# Patient Record
Sex: MALE | Race: WHITE | NOT HISPANIC OR LATINO
[De-identification: names, ages, dates, MRNs, and addresses within clinical notes are randomized per-mention and may not be internally consistent; named-entity substitution may affect disease eponyms.]

---

## 2017-01-05 ENCOUNTER — APPOINTMENT (OUTPATIENT)
Dept: FAMILY MEDICINE | Facility: CLINIC | Age: 67
End: 2017-01-05

## 2017-01-05 VITALS
OXYGEN SATURATION: 99 % | HEART RATE: 80 BPM | BODY MASS INDEX: 28.41 KG/M2 | DIASTOLIC BLOOD PRESSURE: 72 MMHG | HEIGHT: 67 IN | SYSTOLIC BLOOD PRESSURE: 120 MMHG | TEMPERATURE: 98.2 F | RESPIRATION RATE: 12 BRPM | WEIGHT: 181 LBS

## 2017-01-05 DIAGNOSIS — Z23 ENCOUNTER FOR IMMUNIZATION: ICD-10-CM

## 2017-01-05 LAB
BILIRUB UR QL STRIP: NEGATIVE
CLARITY UR: CLEAR
COLLECTION METHOD: NORMAL
GLUCOSE UR-MCNC: NEGATIVE
HCG UR QL: 0.2 EU/DL
HGB UR QL STRIP.AUTO: NORMAL
KETONES UR-MCNC: NEGATIVE
LEUKOCYTE ESTERASE UR QL STRIP: NORMAL
NITRITE UR QL STRIP: NORMAL
PH UR STRIP: 5.5
PROT UR STRIP-MCNC: NORMAL
SP GR UR STRIP: 1.02

## 2017-01-07 LAB
24R-OH-CALCIDIOL SERPL-MCNC: 46.7 PG/ML
ALBUMIN SERPL ELPH-MCNC: 4.5 G/DL
ALP BLD-CCNC: 79 U/L
ALT SERPL-CCNC: 24 U/L
ANION GAP SERPL CALC-SCNC: 15 MMOL/L
AST SERPL-CCNC: 20 U/L
BASOPHILS # BLD AUTO: 0.03 K/UL
BASOPHILS NFR BLD AUTO: 0.3 %
BILIRUB SERPL-MCNC: 0.4 MG/DL
BUN SERPL-MCNC: 26 MG/DL
CALCIUM SERPL-MCNC: 9.7 MG/DL
CHLORIDE SERPL-SCNC: 105 MMOL/L
CHOLEST SERPL-MCNC: 177 MG/DL
CHOLEST/HDLC SERPL: 2.7 RATIO
CO2 SERPL-SCNC: 25 MMOL/L
CREAT SERPL-MCNC: 1.16 MG/DL
EOSINOPHIL # BLD AUTO: 0.1 K/UL
EOSINOPHIL NFR BLD AUTO: 1.1 %
ERYTHROCYTE [SEDIMENTATION RATE] IN BLOOD BY WESTERGREN METHOD: 2 MM/HR
FOLATE SERPL-MCNC: 18.7 NG/ML
GLUCOSE SERPL-MCNC: 105 MG/DL
HCT VFR BLD CALC: 47.9 %
HDLC SERPL-MCNC: 65 MG/DL
HGB BLD-MCNC: 15.6 G/DL
IMM GRANULOCYTES NFR BLD AUTO: 0.2 %
INR PPP: 0.97 RATIO
LDLC SERPL CALC-MCNC: 101 MG/DL
LYMPHOCYTES # BLD AUTO: 4.02 K/UL
LYMPHOCYTES NFR BLD AUTO: 44.3 %
MAN DIFF?: NORMAL
MCHC RBC-ENTMCNC: 30.8 PG
MCHC RBC-ENTMCNC: 32.6 GM/DL
MCV RBC AUTO: 94.5 FL
MONOCYTES # BLD AUTO: 0.52 K/UL
MONOCYTES NFR BLD AUTO: 5.7 %
NEUTROPHILS # BLD AUTO: 4.38 K/UL
NEUTROPHILS NFR BLD AUTO: 48.4 %
PLATELET # BLD AUTO: 278 K/UL
POTASSIUM SERPL-SCNC: 4.6 MMOL/L
PROT SERPL-MCNC: 6.9 G/DL
PT BLD: 11 SEC
RBC # BLD: 5.07 M/UL
RBC # FLD: 14 %
SODIUM SERPL-SCNC: 145 MMOL/L
T3 SERPL-MCNC: 122 NG/DL
T4 SERPL-MCNC: 7.9 UG/DL
TRIGL SERPL-MCNC: 57 MG/DL
TSH SERPL-ACNC: 1.89 UU/ML
VIT B12 SERPL-MCNC: 752 PG/ML
WBC # FLD AUTO: 9.07 K/UL

## 2017-01-11 ENCOUNTER — FORM ENCOUNTER (OUTPATIENT)
Age: 67
End: 2017-01-11

## 2017-01-11 VITALS
OXYGEN SATURATION: 98 % | RESPIRATION RATE: 16 BRPM | TEMPERATURE: 97 F | HEART RATE: 91 BPM | DIASTOLIC BLOOD PRESSURE: 80 MMHG | WEIGHT: 189.82 LBS | HEIGHT: 67 IN | SYSTOLIC BLOOD PRESSURE: 132 MMHG

## 2017-01-11 NOTE — ASU PATIENT PROFILE, ADULT - PMH
AN (acoustic neuroma)    BPH (benign prostatic hyperplasia)    Calculus of kidney  Kidney stone  Facial nerve paralysis    LETICIA (generalized anxiety disorder)    GERD (gastroesophageal reflux disease)    Measles    Meningitis    Nocturia

## 2017-01-11 NOTE — ASU PATIENT PROFILE, ADULT - PSH
History of cranioplasty    History of craniotomy    History of facial surgery    History of surgery  Rhytidectomy Superficial Muscloaponeurotic system

## 2017-01-12 ENCOUNTER — OUTPATIENT (OUTPATIENT)
Dept: OUTPATIENT SERVICES | Facility: HOSPITAL | Age: 67
LOS: 1 days | Discharge: HOME | End: 2017-01-12
Payer: MEDICARE

## 2017-01-12 ENCOUNTER — APPOINTMENT (OUTPATIENT)
Dept: OTOLARYNGOLOGY | Facility: HOSPITAL | Age: 67
End: 2017-01-12

## 2017-01-12 VITALS
TEMPERATURE: 98 F | SYSTOLIC BLOOD PRESSURE: 153 MMHG | HEART RATE: 81 BPM | RESPIRATION RATE: 16 BRPM | OXYGEN SATURATION: 98 % | DIASTOLIC BLOOD PRESSURE: 72 MMHG

## 2017-01-12 DIAGNOSIS — Z98.890 OTHER SPECIFIED POSTPROCEDURAL STATES: Chronic | ICD-10-CM

## 2017-01-12 PROCEDURE — 71046 X-RAY EXAM CHEST 2 VIEWS: CPT

## 2017-01-12 PROCEDURE — 13152 CMPLX RPR E/N/E/L 2.6-7.5 CM: CPT | Mod: XU

## 2017-01-12 PROCEDURE — 71020: CPT | Mod: 26

## 2017-01-12 PROCEDURE — 67900 REPAIR BROW DEFECT: CPT | Mod: RT

## 2017-01-12 PROCEDURE — 15839 EXC EXCESSIVE SKN OTHER AREA: CPT

## 2017-01-12 PROCEDURE — C1889: CPT

## 2017-01-12 PROCEDURE — 14060 TIS TRNFR E/N/E/L 10 SQ CM/<: CPT

## 2017-01-12 RX ORDER — SODIUM CHLORIDE 9 MG/ML
1000 INJECTION, SOLUTION INTRAVENOUS
Qty: 0 | Refills: 0 | Status: DISCONTINUED | OUTPATIENT
Start: 2017-01-12 | End: 2017-01-12

## 2017-01-12 RX ORDER — ONDANSETRON 8 MG/1
6 TABLET, FILM COATED ORAL EVERY 4 HOURS
Qty: 0 | Refills: 0 | Status: DISCONTINUED | OUTPATIENT
Start: 2017-01-12 | End: 2017-01-12

## 2017-01-12 RX ORDER — ACETAMINOPHEN 500 MG
650 TABLET ORAL EVERY 6 HOURS
Qty: 0 | Refills: 0 | Status: DISCONTINUED | OUTPATIENT
Start: 2017-01-12 | End: 2017-01-12

## 2017-01-12 RX ORDER — MORPHINE SULFATE 50 MG/1
4 CAPSULE, EXTENDED RELEASE ORAL
Qty: 0 | Refills: 0 | Status: DISCONTINUED | OUTPATIENT
Start: 2017-01-12 | End: 2017-01-12

## 2017-01-12 RX ADMIN — MORPHINE SULFATE 4 MILLIGRAM(S): 50 CAPSULE, EXTENDED RELEASE ORAL at 20:00

## 2017-01-12 RX ADMIN — ONDANSETRON 6 MILLIGRAM(S): 8 TABLET, FILM COATED ORAL at 20:52

## 2017-01-12 RX ADMIN — Medication 650 MILLIGRAM(S): at 21:49

## 2017-01-12 RX ADMIN — MORPHINE SULFATE 4 MILLIGRAM(S): 50 CAPSULE, EXTENDED RELEASE ORAL at 18:16

## 2017-01-12 RX ADMIN — MORPHINE SULFATE 4 MILLIGRAM(S): 50 CAPSULE, EXTENDED RELEASE ORAL at 18:30

## 2017-01-12 RX ADMIN — MORPHINE SULFATE 4 MILLIGRAM(S): 50 CAPSULE, EXTENDED RELEASE ORAL at 18:45

## 2017-01-12 RX ADMIN — Medication 650 MILLIGRAM(S): at 21:48

## 2017-01-20 DIAGNOSIS — H61.192: ICD-10-CM

## 2017-01-20 DIAGNOSIS — L98.7 EXCESSIVE AND REDUNDANT SKIN AND SUBCUTANEOUS TISSUE: ICD-10-CM

## 2017-01-20 DIAGNOSIS — M79.9 SOFT TISSUE DISORDER, UNSPECIFIED: ICD-10-CM

## 2017-01-20 DIAGNOSIS — M95.2 OTHER ACQUIRED DEFORMITY OF HEAD: ICD-10-CM

## 2017-01-20 DIAGNOSIS — N40.0 BENIGN PROSTATIC HYPERPLASIA WITHOUT LOWER URINARY TRACT SYMPTOMS: ICD-10-CM

## 2017-01-20 DIAGNOSIS — G51.0 BELL'S PALSY: ICD-10-CM

## 2017-01-20 DIAGNOSIS — K21.9 GASTRO-ESOPHAGEAL REFLUX DISEASE WITHOUT ESOPHAGITIS: ICD-10-CM

## 2017-01-20 DIAGNOSIS — L91.0 HYPERTROPHIC SCAR: ICD-10-CM

## 2017-01-20 DIAGNOSIS — H02.401 UNSPECIFIED PTOSIS OF RIGHT EYELID: ICD-10-CM

## 2017-02-27 PROBLEM — G51.0 BELL'S PALSY: Chronic | Status: ACTIVE | Noted: 2017-01-11

## 2017-02-27 PROBLEM — N40.0 BENIGN PROSTATIC HYPERPLASIA WITHOUT LOWER URINARY TRACT SYMPTOMS: Chronic | Status: ACTIVE | Noted: 2017-01-11

## 2017-02-27 PROBLEM — F41.1 GENERALIZED ANXIETY DISORDER: Chronic | Status: ACTIVE | Noted: 2017-01-11

## 2017-02-27 PROBLEM — R35.1 NOCTURIA: Chronic | Status: ACTIVE | Noted: 2017-01-11

## 2017-02-27 PROBLEM — D33.3 BENIGN NEOPLASM OF CRANIAL NERVES: Chronic | Status: ACTIVE | Noted: 2017-01-11

## 2017-02-27 PROBLEM — B05.9 MEASLES WITHOUT COMPLICATION: Chronic | Status: ACTIVE | Noted: 2017-01-11

## 2017-02-27 PROBLEM — G03.9 MENINGITIS, UNSPECIFIED: Chronic | Status: ACTIVE | Noted: 2017-01-11

## 2017-02-27 PROBLEM — K21.9 GASTRO-ESOPHAGEAL REFLUX DISEASE WITHOUT ESOPHAGITIS: Chronic | Status: ACTIVE | Noted: 2017-01-11

## 2017-03-01 ENCOUNTER — APPOINTMENT (OUTPATIENT)
Dept: FAMILY MEDICINE | Facility: CLINIC | Age: 67
End: 2017-03-01

## 2017-03-01 VITALS
BODY MASS INDEX: 28.41 KG/M2 | WEIGHT: 181 LBS | DIASTOLIC BLOOD PRESSURE: 74 MMHG | HEIGHT: 67 IN | HEART RATE: 74 BPM | RESPIRATION RATE: 13 BRPM | OXYGEN SATURATION: 94 % | SYSTOLIC BLOOD PRESSURE: 122 MMHG

## 2017-03-01 LAB
BILIRUB UR QL STRIP: NORMAL
CLARITY UR: CLEAR
COLLECTION METHOD: NORMAL
GLUCOSE UR-MCNC: NORMAL
HBA1C MFR BLD HPLC: 5.4
HCG UR QL: 0.2 EU/DL
HGB UR QL STRIP.AUTO: NORMAL
KETONES UR-MCNC: NORMAL
LEUKOCYTE ESTERASE UR QL STRIP: NORMAL
NITRITE UR QL STRIP: NORMAL
PH UR STRIP: 5.5
PROT UR STRIP-MCNC: 30
SP GR UR STRIP: 1.03

## 2017-03-12 ENCOUNTER — FORM ENCOUNTER (OUTPATIENT)
Age: 67
End: 2017-03-12

## 2017-03-13 ENCOUNTER — APPOINTMENT (OUTPATIENT)
Dept: OTOLARYNGOLOGY | Facility: CLINIC | Age: 67
End: 2017-03-13

## 2017-03-13 ENCOUNTER — OUTPATIENT (OUTPATIENT)
Dept: OUTPATIENT SERVICES | Facility: HOSPITAL | Age: 67
LOS: 1 days | End: 2017-03-13
Payer: MEDICARE

## 2017-03-13 ENCOUNTER — APPOINTMENT (OUTPATIENT)
Dept: PULMONOLOGY | Facility: CLINIC | Age: 67
End: 2017-03-13

## 2017-03-13 VITALS
HEIGHT: 67 IN | DIASTOLIC BLOOD PRESSURE: 91 MMHG | WEIGHT: 181 LBS | BODY MASS INDEX: 28.41 KG/M2 | SYSTOLIC BLOOD PRESSURE: 134 MMHG | TEMPERATURE: 97.8 F | HEART RATE: 90 BPM

## 2017-03-13 DIAGNOSIS — Z98.890 OTHER SPECIFIED POSTPROCEDURAL STATES: Chronic | ICD-10-CM

## 2017-03-13 PROCEDURE — 70486 CT MAXILLOFACIAL W/O DYE: CPT | Mod: 26

## 2017-03-13 PROCEDURE — 71020: CPT | Mod: 26

## 2017-03-13 PROCEDURE — 70486 CT MAXILLOFACIAL W/O DYE: CPT

## 2017-03-13 PROCEDURE — 71046 X-RAY EXAM CHEST 2 VIEWS: CPT

## 2017-03-15 ENCOUNTER — LABORATORY RESULT (OUTPATIENT)
Age: 67
End: 2017-03-15

## 2017-03-15 ENCOUNTER — APPOINTMENT (OUTPATIENT)
Dept: FAMILY MEDICINE | Facility: CLINIC | Age: 67
End: 2017-03-15

## 2017-03-15 ENCOUNTER — RESULT REVIEW (OUTPATIENT)
Age: 67
End: 2017-03-15

## 2017-03-15 VITALS
HEIGHT: 67 IN | TEMPERATURE: 98.7 F | WEIGHT: 181 LBS | HEART RATE: 90 BPM | SYSTOLIC BLOOD PRESSURE: 128 MMHG | RESPIRATION RATE: 12 BRPM | BODY MASS INDEX: 28.41 KG/M2 | OXYGEN SATURATION: 95 % | DIASTOLIC BLOOD PRESSURE: 74 MMHG

## 2017-03-15 VITALS
HEIGHT: 67 IN | TEMPERATURE: 97 F | HEART RATE: 66 BPM | SYSTOLIC BLOOD PRESSURE: 126 MMHG | DIASTOLIC BLOOD PRESSURE: 74 MMHG | WEIGHT: 188.94 LBS | OXYGEN SATURATION: 97 % | RESPIRATION RATE: 16 BRPM

## 2017-03-15 LAB — GLUCOSE BLDC GLUCOMTR-MCNC: 85

## 2017-03-16 ENCOUNTER — OUTPATIENT (OUTPATIENT)
Dept: OUTPATIENT SERVICES | Facility: HOSPITAL | Age: 67
LOS: 1 days | Discharge: HOME | End: 2017-03-16
Payer: MEDICARE

## 2017-03-16 ENCOUNTER — APPOINTMENT (OUTPATIENT)
Dept: OTOLARYNGOLOGY | Facility: HOSPITAL | Age: 67
End: 2017-03-16

## 2017-03-16 VITALS
OXYGEN SATURATION: 97 % | RESPIRATION RATE: 16 BRPM | SYSTOLIC BLOOD PRESSURE: 138 MMHG | HEART RATE: 74 BPM | DIASTOLIC BLOOD PRESSURE: 74 MMHG

## 2017-03-16 DIAGNOSIS — Z98.890 OTHER SPECIFIED POSTPROCEDURAL STATES: Chronic | ICD-10-CM

## 2017-03-16 PROCEDURE — 15839 EXC EXCESSIVE SKN OTHER AREA: CPT | Mod: 76,78

## 2017-03-16 PROCEDURE — 88300 SURGICAL PATH GROSS: CPT

## 2017-03-16 PROCEDURE — 21012 EXC FACE LES SBQ 2 CM/>: CPT

## 2017-03-16 PROCEDURE — C1889: CPT

## 2017-03-16 PROCEDURE — 67560 REVISE EYE SOCKET IMPLANT: CPT | Mod: RT

## 2017-03-16 PROCEDURE — 21282 LATERAL CANTHOPEXY: CPT | Mod: 78

## 2017-03-16 PROCEDURE — 21282 LATERAL CANTHOPEXY: CPT

## 2017-03-16 PROCEDURE — 67413 EXPLORE/TREAT EYE SOCKET: CPT | Mod: 78,RT

## 2017-03-16 RX ORDER — CEFUROXIME AXETIL 250 MG
1 TABLET ORAL
Qty: 10 | Refills: 0
Start: 2017-03-16 | End: 2017-03-21

## 2017-03-16 RX ORDER — ONDANSETRON 8 MG/1
4 TABLET, FILM COATED ORAL ONCE
Qty: 0 | Refills: 0 | Status: DISCONTINUED | OUTPATIENT
Start: 2017-03-16 | End: 2017-03-16

## 2017-03-16 RX ORDER — MORPHINE SULFATE 50 MG/1
4 CAPSULE, EXTENDED RELEASE ORAL
Qty: 0 | Refills: 0 | Status: DISCONTINUED | OUTPATIENT
Start: 2017-03-16 | End: 2017-03-16

## 2017-03-16 RX ORDER — SODIUM CHLORIDE 9 MG/ML
1000 INJECTION, SOLUTION INTRAVENOUS
Qty: 0 | Refills: 0 | Status: DISCONTINUED | OUTPATIENT
Start: 2017-03-16 | End: 2017-03-16

## 2017-03-16 RX ORDER — ACETAMINOPHEN WITH CODEINE 300MG-30MG
1 TABLET ORAL
Qty: 16 | Refills: 0
Start: 2017-03-16 | End: 2017-03-20

## 2017-03-16 RX ADMIN — MORPHINE SULFATE 4 MILLIGRAM(S): 50 CAPSULE, EXTENDED RELEASE ORAL at 10:43

## 2017-03-20 LAB — SURGICAL PATHOLOGY STUDY: SIGNIFICANT CHANGE UP
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
## 2017-03-22 DIAGNOSIS — L03.211 CELLULITIS OF FACE: ICD-10-CM

## 2017-03-22 DIAGNOSIS — T85.848A PAIN DUE TO OTHER INTERNAL PROSTHETIC DEVICES, IMPLANTS AND GRAFTS, INITIAL ENCOUNTER: ICD-10-CM

## 2017-03-22 DIAGNOSIS — T85.328A: ICD-10-CM
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
## 2017-03-22 DIAGNOSIS — G51.0 BELL'S PALSY: ICD-10-CM

## 2017-03-27 ENCOUNTER — APPOINTMENT (OUTPATIENT)
Dept: OTOLARYNGOLOGY | Facility: CLINIC | Age: 67
End: 2017-03-27

## 2017-03-27 VITALS
WEIGHT: 181 LBS | HEART RATE: 69 BPM | HEIGHT: 67 IN | DIASTOLIC BLOOD PRESSURE: 84 MMHG | TEMPERATURE: 98.2 F | SYSTOLIC BLOOD PRESSURE: 131 MMHG | BODY MASS INDEX: 28.41 KG/M2

## 2017-04-03 ENCOUNTER — APPOINTMENT (OUTPATIENT)
Dept: OTOLARYNGOLOGY | Facility: CLINIC | Age: 67
End: 2017-04-03

## 2017-04-03 VITALS
BODY MASS INDEX: 28.41 KG/M2 | HEART RATE: 62 BPM | TEMPERATURE: 98 F | DIASTOLIC BLOOD PRESSURE: 76 MMHG | SYSTOLIC BLOOD PRESSURE: 104 MMHG | WEIGHT: 181 LBS | HEIGHT: 67 IN

## 2017-04-05 VITALS
HEIGHT: 67 IN | RESPIRATION RATE: 16 BRPM | SYSTOLIC BLOOD PRESSURE: 136 MMHG | DIASTOLIC BLOOD PRESSURE: 72 MMHG | OXYGEN SATURATION: 98 % | HEART RATE: 61 BPM | WEIGHT: 189.6 LBS | TEMPERATURE: 97 F

## 2017-04-06 ENCOUNTER — APPOINTMENT (OUTPATIENT)
Dept: OTOLARYNGOLOGY | Facility: HOSPITAL | Age: 67
End: 2017-04-06

## 2017-04-06 ENCOUNTER — OUTPATIENT (OUTPATIENT)
Dept: OUTPATIENT SERVICES | Facility: HOSPITAL | Age: 67
LOS: 1 days | Discharge: HOME | End: 2017-04-06
Payer: MEDICARE

## 2017-04-06 VITALS
SYSTOLIC BLOOD PRESSURE: 141 MMHG | TEMPERATURE: 97 F | RESPIRATION RATE: 17 BRPM | OXYGEN SATURATION: 98 % | DIASTOLIC BLOOD PRESSURE: 90 MMHG

## 2017-04-06 DIAGNOSIS — Z98.890 OTHER SPECIFIED POSTPROCEDURAL STATES: Chronic | ICD-10-CM

## 2017-04-06 PROCEDURE — C1889: CPT

## 2017-04-06 PROCEDURE — 67912 CORRECTION EYELID W/IMPLANT: CPT | Mod: E3

## 2017-04-06 PROCEDURE — 67917 REPAIR EYELID DEFECT: CPT | Mod: 58,E2

## 2017-04-06 PROCEDURE — 67912 CORRECTION EYELID W/IMPLANT: CPT | Mod: 58,E1

## 2017-04-06 PROCEDURE — 67950 REVISION OF EYELID: CPT | Mod: E4

## 2017-04-06 RX ORDER — NEOMYCIN/POLYMYXIN B/DEXAMETHA 0.1 %
2 SUSPENSION, DROPS(FINAL DOSAGE FORM)(ML) OPHTHALMIC (EYE)
Qty: 1 | Refills: 1 | OUTPATIENT
Start: 2017-04-06

## 2017-04-06 RX ORDER — NEOMYCIN/POLYMYXIN B/DEXAMETHA 0.1 %
2 SUSPENSION, DROPS(FINAL DOSAGE FORM)(ML) OPHTHALMIC (EYE)
Qty: 1 | Refills: 1
Start: 2017-04-06

## 2017-04-06 RX ORDER — ACETAMINOPHEN WITH CODEINE 300MG-30MG
1 TABLET ORAL
Qty: 20 | Refills: 0 | OUTPATIENT
Start: 2017-04-06

## 2017-04-06 RX ORDER — SODIUM CHLORIDE 9 MG/ML
1000 INJECTION, SOLUTION INTRAVENOUS
Qty: 0 | Refills: 0 | Status: DISCONTINUED | OUTPATIENT
Start: 2017-04-06 | End: 2017-04-06

## 2017-04-06 RX ORDER — MORPHINE SULFATE 50 MG/1
4 CAPSULE, EXTENDED RELEASE ORAL
Qty: 0 | Refills: 0 | Status: DISCONTINUED | OUTPATIENT
Start: 2017-04-06 | End: 2017-04-06

## 2017-04-06 RX ORDER — ACETAMINOPHEN WITH CODEINE 300MG-30MG
1 TABLET ORAL
Qty: 8 | Refills: 0
Start: 2017-04-06

## 2017-04-06 RX ORDER — ONDANSETRON 8 MG/1
4 TABLET, FILM COATED ORAL EVERY 6 HOURS
Qty: 0 | Refills: 0 | Status: DISCONTINUED | OUTPATIENT
Start: 2017-04-06 | End: 2017-04-06

## 2017-04-06 RX ORDER — BACITRACIN 500 [USP'U]/G
1 OINTMENT OPHTHALMIC
Qty: 1 | Refills: 0 | OUTPATIENT
Start: 2017-04-06

## 2017-04-06 RX ORDER — BACITRACIN 500 [USP'U]/G
1 OINTMENT OPHTHALMIC
Qty: 1 | Refills: 0
Start: 2017-04-06

## 2017-04-06 NOTE — PACU DISCHARGE NOTE - COMMENTS
Written and verbal discharge instructions given to Pt.  Pt verbalized understanding of post operative instructions. Copy of provider instructions given to Pt.

## 2017-04-06 NOTE — BRIEF OPERATIVE NOTE - PROCEDURE
Gold weight inserted in eyelid  04/06/2017    Active  AVERMA  Canthoplasty of right eye  04/06/2017    Active  AVERMA  Scleral patch graft of right eye  04/06/2017    Active  AVERMA

## 2017-04-10 ENCOUNTER — APPOINTMENT (OUTPATIENT)
Dept: OTOLARYNGOLOGY | Facility: CLINIC | Age: 67
End: 2017-04-10

## 2017-04-11 DIAGNOSIS — H02.231 PARALYTIC LAGOPHTHALMOS RIGHT UPPER EYELID: ICD-10-CM

## 2017-04-11 DIAGNOSIS — K21.9 GASTRO-ESOPHAGEAL REFLUX DISEASE WITHOUT ESOPHAGITIS: ICD-10-CM

## 2017-04-11 DIAGNOSIS — G51.0 BELL'S PALSY: ICD-10-CM

## 2017-04-11 DIAGNOSIS — L57.4 CUTIS LAXA SENILIS: ICD-10-CM

## 2017-04-11 DIAGNOSIS — N40.0 BENIGN PROSTATIC HYPERPLASIA WITHOUT LOWER URINARY TRACT SYMPTOMS: ICD-10-CM

## 2017-04-11 DIAGNOSIS — F41.1 GENERALIZED ANXIETY DISORDER: ICD-10-CM

## 2017-04-11 DIAGNOSIS — R73.03 PREDIABETES: ICD-10-CM

## 2017-04-17 ENCOUNTER — APPOINTMENT (OUTPATIENT)
Dept: OTOLARYNGOLOGY | Facility: CLINIC | Age: 67
End: 2017-04-17

## 2017-05-04 ENCOUNTER — APPOINTMENT (OUTPATIENT)
Dept: OTOLARYNGOLOGY | Facility: CLINIC | Age: 67
End: 2017-05-04

## 2017-05-04 ENCOUNTER — APPOINTMENT (OUTPATIENT)
Dept: FAMILY MEDICINE | Facility: CLINIC | Age: 67
End: 2017-05-04

## 2017-05-04 VITALS
HEART RATE: 100 BPM | WEIGHT: 185 LBS | SYSTOLIC BLOOD PRESSURE: 120 MMHG | DIASTOLIC BLOOD PRESSURE: 74 MMHG | RESPIRATION RATE: 12 BRPM | BODY MASS INDEX: 29.03 KG/M2 | OXYGEN SATURATION: 98 % | HEIGHT: 67 IN

## 2017-05-04 RX ORDER — CEFADROXIL 500 MG/1
500 CAPSULE ORAL
Qty: 28 | Refills: 0 | Status: DISCONTINUED | COMMUNITY
Start: 2017-04-06 | End: 2017-05-04

## 2017-05-04 RX ORDER — BACITRACIN 500 [USP'U]/G
500 OINTMENT OPHTHALMIC
Qty: 35 | Refills: 0 | Status: DISCONTINUED | COMMUNITY
Start: 2017-04-06 | End: 2017-05-04

## 2017-08-25 ENCOUNTER — APPOINTMENT (OUTPATIENT)
Dept: OPHTHALMOLOGY | Facility: CLINIC | Age: 67
End: 2017-08-25

## 2017-10-05 ENCOUNTER — APPOINTMENT (OUTPATIENT)
Dept: OPHTHALMOLOGY | Facility: CLINIC | Age: 67
End: 2017-10-05

## 2017-10-17 ENCOUNTER — LABORATORY RESULT (OUTPATIENT)
Age: 67
End: 2017-10-17

## 2017-10-17 ENCOUNTER — APPOINTMENT (OUTPATIENT)
Dept: FAMILY MEDICINE | Facility: CLINIC | Age: 67
End: 2017-10-17
Payer: MEDICARE

## 2017-10-17 VITALS
OXYGEN SATURATION: 98 % | DIASTOLIC BLOOD PRESSURE: 74 MMHG | HEART RATE: 89 BPM | SYSTOLIC BLOOD PRESSURE: 122 MMHG | BODY MASS INDEX: 29.03 KG/M2 | WEIGHT: 185 LBS | HEIGHT: 67 IN | RESPIRATION RATE: 12 BRPM

## 2017-10-17 DIAGNOSIS — Z86.69 PERSONAL HISTORY OF OTHER DISEASES OF THE NERVOUS SYSTEM AND SENSE ORGANS: ICD-10-CM

## 2017-10-17 DIAGNOSIS — L08.9 LOCAL INFECTION OF THE SKIN AND SUBCUTANEOUS TISSUE, UNSPECIFIED: ICD-10-CM

## 2017-10-17 PROCEDURE — 36415 COLL VENOUS BLD VENIPUNCTURE: CPT

## 2017-10-17 PROCEDURE — G0008: CPT | Mod: 59

## 2017-10-17 PROCEDURE — 99214 OFFICE O/P EST MOD 30 MIN: CPT | Mod: 25

## 2017-10-17 PROCEDURE — 90662 IIV NO PRSV INCREASED AG IM: CPT

## 2017-10-17 RX ORDER — NEOMYCIN SULFATE, POLYMYXIN B SULFATE AND DEXAMETHASONE 3.5; 10000; 1 MG/ML; [USP'U]/ML; MG/ML
3.5-10000-0.1 SUSPENSION OPHTHALMIC
Qty: 5 | Refills: 0 | Status: DISCONTINUED | COMMUNITY
Start: 2017-04-06 | End: 2017-10-17

## 2017-10-17 RX ORDER — ESZOPICLONE 3 MG/1
3 TABLET, FILM COATED ORAL
Qty: 30 | Refills: 0 | Status: DISCONTINUED | COMMUNITY
Start: 2017-01-05 | End: 2017-10-17

## 2017-10-17 RX ORDER — ACETAMINOPHEN AND CODEINE 300; 30 MG/1; MG/1
300-30 TABLET ORAL
Qty: 8 | Refills: 0 | Status: DISCONTINUED | COMMUNITY
Start: 2017-04-06 | End: 2017-10-17

## 2017-10-17 RX ORDER — TAMSULOSIN HYDROCHLORIDE 0.4 MG/1
0.4 CAPSULE ORAL
Qty: 90 | Refills: 3 | Status: DISCONTINUED | COMMUNITY
Start: 2017-05-04 | End: 2017-10-17

## 2017-10-18 LAB
ALBUMIN SERPL ELPH-MCNC: 4.5 G/DL
ALP BLD-CCNC: 84 U/L
ALT SERPL-CCNC: 24 U/L
ANION GAP SERPL CALC-SCNC: 14 MMOL/L
APPEARANCE: CLEAR
AST SERPL-CCNC: 16 U/L
BASOPHILS # BLD AUTO: 0.03 K/UL
BASOPHILS NFR BLD AUTO: 0.4 %
BILIRUB SERPL-MCNC: 0.4 MG/DL
BILIRUBIN URINE: NEGATIVE
BLOOD URINE: ABNORMAL
BUN SERPL-MCNC: 25 MG/DL
CALCIUM SERPL-MCNC: 9.9 MG/DL
CHLORIDE SERPL-SCNC: 105 MMOL/L
CHOLEST SERPL-MCNC: 168 MG/DL
CHOLEST/HDLC SERPL: 2.9 RATIO
CO2 SERPL-SCNC: 26 MMOL/L
COLOR: YELLOW
CREAT SERPL-MCNC: 1.01 MG/DL
EOSINOPHIL # BLD AUTO: 0.11 K/UL
EOSINOPHIL NFR BLD AUTO: 1.3 %
GLUCOSE QUALITATIVE U: NEGATIVE MG/DL
GLUCOSE SERPL-MCNC: 89 MG/DL
HCT VFR BLD CALC: 48.3 %
HDLC SERPL-MCNC: 57 MG/DL
HGB BLD-MCNC: 15.9 G/DL
IMM GRANULOCYTES NFR BLD AUTO: 0.1 %
KETONES URINE: NEGATIVE
LDLC SERPL CALC-MCNC: 80 MG/DL
LEUKOCYTE ESTERASE URINE: NEGATIVE
LYMPHOCYTES # BLD AUTO: 2.94 K/UL
LYMPHOCYTES NFR BLD AUTO: 35.3 %
MAN DIFF?: NORMAL
MCHC RBC-ENTMCNC: 30.8 PG
MCHC RBC-ENTMCNC: 32.9 GM/DL
MCV RBC AUTO: 93.6 FL
MONOCYTES # BLD AUTO: 0.53 K/UL
MONOCYTES NFR BLD AUTO: 6.4 %
NEUTROPHILS # BLD AUTO: 4.7 K/UL
NEUTROPHILS NFR BLD AUTO: 56.5 %
NITRITE URINE: NEGATIVE
PH URINE: 5
PLATELET # BLD AUTO: 297 K/UL
POTASSIUM SERPL-SCNC: 4.3 MMOL/L
PROT SERPL-MCNC: 7 G/DL
PROTEIN URINE: ABNORMAL MG/DL
RBC # BLD: 5.16 M/UL
RBC # FLD: 13.9 %
SODIUM SERPL-SCNC: 145 MMOL/L
SPECIFIC GRAVITY URINE: 1.03
TRIGL SERPL-MCNC: 155 MG/DL
TSH SERPL-ACNC: 1.76 UIU/ML
UROBILINOGEN URINE: NEGATIVE MG/DL
WBC # FLD AUTO: 8.32 K/UL

## 2017-11-01 ENCOUNTER — APPOINTMENT (OUTPATIENT)
Dept: FAMILY MEDICINE | Facility: CLINIC | Age: 67
End: 2017-11-01
Payer: MEDICARE

## 2017-11-01 VITALS
WEIGHT: 191 LBS | SYSTOLIC BLOOD PRESSURE: 134 MMHG | DIASTOLIC BLOOD PRESSURE: 84 MMHG | HEIGHT: 67 IN | OXYGEN SATURATION: 98 % | RESPIRATION RATE: 12 BRPM | TEMPERATURE: 98.1 F | HEART RATE: 67 BPM | BODY MASS INDEX: 29.98 KG/M2

## 2017-11-01 PROCEDURE — 99215 OFFICE O/P EST HI 40 MIN: CPT | Mod: 25

## 2017-11-01 PROCEDURE — 36415 COLL VENOUS BLD VENIPUNCTURE: CPT

## 2017-11-01 RX ORDER — ERYTHROMYCIN 5 MG/G
5 OINTMENT OPHTHALMIC
Qty: 4 | Refills: 0 | Status: COMPLETED | COMMUNITY
Start: 2017-09-12

## 2017-11-02 LAB
ALBUMIN SERPL ELPH-MCNC: 4.1 G/DL
ALP BLD-CCNC: 74 U/L
ALT SERPL-CCNC: 29 U/L
ANION GAP SERPL CALC-SCNC: 17 MMOL/L
AST SERPL-CCNC: 23 U/L
BASOPHILS # BLD AUTO: 0.03 K/UL
BASOPHILS NFR BLD AUTO: 0.3 %
BILIRUB SERPL-MCNC: 0.3 MG/DL
BUN SERPL-MCNC: 18 MG/DL
CALCIUM SERPL-MCNC: 9.7 MG/DL
CHLORIDE SERPL-SCNC: 105 MMOL/L
CO2 SERPL-SCNC: 23 MMOL/L
CREAT SERPL-MCNC: 1.04 MG/DL
EOSINOPHIL # BLD AUTO: 0.07 K/UL
EOSINOPHIL NFR BLD AUTO: 0.8 %
ERYTHROCYTE [SEDIMENTATION RATE] IN BLOOD BY WESTERGREN METHOD: 5 MM/HR
GLUCOSE SERPL-MCNC: 71 MG/DL
HBA1C MFR BLD HPLC: 5.7 %
HCT VFR BLD CALC: 48.3 %
HGB BLD-MCNC: 15.8 G/DL
IMM GRANULOCYTES NFR BLD AUTO: 0.2 %
LYMPHOCYTES # BLD AUTO: 2.93 K/UL
LYMPHOCYTES NFR BLD AUTO: 34 %
MAGNESIUM SERPL-MCNC: 2 MG/DL
MAN DIFF?: NORMAL
MCHC RBC-ENTMCNC: 30.9 PG
MCHC RBC-ENTMCNC: 32.7 GM/DL
MCV RBC AUTO: 94.5 FL
MONOCYTES # BLD AUTO: 0.6 K/UL
MONOCYTES NFR BLD AUTO: 7 %
NEUTROPHILS # BLD AUTO: 4.98 K/UL
NEUTROPHILS NFR BLD AUTO: 57.7 %
PLATELET # BLD AUTO: 296 K/UL
POTASSIUM SERPL-SCNC: 4.4 MMOL/L
PROT SERPL-MCNC: 7 G/DL
RBC # BLD: 5.11 M/UL
RBC # FLD: 14 %
SODIUM SERPL-SCNC: 145 MMOL/L
TSH SERPL-ACNC: 1.87 UIU/ML
WBC # FLD AUTO: 8.63 K/UL

## 2017-11-08 ENCOUNTER — APPOINTMENT (OUTPATIENT)
Dept: FAMILY MEDICINE | Facility: CLINIC | Age: 67
End: 2017-11-08

## 2017-11-08 LAB — HEMOCCULT STL QL IA: NEGATIVE

## 2017-12-18 ENCOUNTER — APPOINTMENT (OUTPATIENT)
Dept: CARDIOLOGY | Facility: CLINIC | Age: 67
End: 2017-12-18

## 2018-01-10 ENCOUNTER — APPOINTMENT (OUTPATIENT)
Dept: CARDIOLOGY | Facility: CLINIC | Age: 68
End: 2018-01-10
Payer: MEDICARE

## 2018-01-10 PROCEDURE — 93306 TTE W/DOPPLER COMPLETE: CPT

## 2018-01-12 ENCOUNTER — RECORD ABSTRACTING (OUTPATIENT)
Age: 68
End: 2018-01-12

## 2018-02-13 ENCOUNTER — APPOINTMENT (OUTPATIENT)
Dept: CARDIOLOGY | Facility: CLINIC | Age: 68
End: 2018-02-13
Payer: MEDICARE

## 2018-02-13 VITALS
WEIGHT: 192 LBS | HEART RATE: 67 BPM | DIASTOLIC BLOOD PRESSURE: 90 MMHG | BODY MASS INDEX: 30.86 KG/M2 | SYSTOLIC BLOOD PRESSURE: 124 MMHG | HEIGHT: 66 IN | RESPIRATION RATE: 14 BRPM

## 2018-02-13 PROCEDURE — 99213 OFFICE O/P EST LOW 20 MIN: CPT

## 2018-02-13 PROCEDURE — 93000 ELECTROCARDIOGRAM COMPLETE: CPT

## 2018-02-23 ENCOUNTER — APPOINTMENT (OUTPATIENT)
Dept: FAMILY MEDICINE | Facility: CLINIC | Age: 68
End: 2018-02-23
Payer: MEDICARE

## 2018-02-23 VITALS
OXYGEN SATURATION: 99 % | WEIGHT: 192 LBS | BODY MASS INDEX: 30.86 KG/M2 | TEMPERATURE: 98.2 F | SYSTOLIC BLOOD PRESSURE: 118 MMHG | RESPIRATION RATE: 14 BRPM | HEIGHT: 66 IN | HEART RATE: 56 BPM | DIASTOLIC BLOOD PRESSURE: 84 MMHG

## 2018-02-23 PROCEDURE — 99214 OFFICE O/P EST MOD 30 MIN: CPT

## 2018-03-17 ENCOUNTER — APPOINTMENT (OUTPATIENT)
Dept: FAMILY MEDICINE | Facility: CLINIC | Age: 68
End: 2018-03-17
Payer: MEDICARE

## 2018-03-17 ENCOUNTER — EMERGENCY (EMERGENCY)
Facility: HOSPITAL | Age: 68
LOS: 1 days | Discharge: HOME | End: 2018-03-17
Attending: EMERGENCY MEDICINE
Payer: MEDICARE

## 2018-03-17 VITALS
OXYGEN SATURATION: 97 % | DIASTOLIC BLOOD PRESSURE: 78 MMHG | BODY MASS INDEX: 31.18 KG/M2 | SYSTOLIC BLOOD PRESSURE: 120 MMHG | WEIGHT: 194 LBS | TEMPERATURE: 97.9 F | HEART RATE: 68 BPM | RESPIRATION RATE: 12 BRPM | HEIGHT: 66 IN

## 2018-03-17 VITALS
SYSTOLIC BLOOD PRESSURE: 146 MMHG | DIASTOLIC BLOOD PRESSURE: 93 MMHG | HEART RATE: 88 BPM | RESPIRATION RATE: 18 BRPM | OXYGEN SATURATION: 99 % | TEMPERATURE: 98 F

## 2018-03-17 VITALS — WEIGHT: 190.04 LBS | HEIGHT: 66 IN

## 2018-03-17 DIAGNOSIS — Z92.29 PERSONAL HISTORY OF OTHER DRUG THERAPY: ICD-10-CM

## 2018-03-17 DIAGNOSIS — Z98.890 OTHER SPECIFIED POSTPROCEDURAL STATES: Chronic | ICD-10-CM

## 2018-03-17 LAB
ALBUMIN SERPL ELPH-MCNC: 4.3 G/DL — SIGNIFICANT CHANGE UP (ref 3.3–5.2)
ALP SERPL-CCNC: 72 U/L — SIGNIFICANT CHANGE UP (ref 40–120)
ALT FLD-CCNC: 20 U/L — SIGNIFICANT CHANGE UP
ANION GAP SERPL CALC-SCNC: 13 MMOL/L — SIGNIFICANT CHANGE UP (ref 5–17)
APPEARANCE UR: CLEAR — SIGNIFICANT CHANGE UP
AST SERPL-CCNC: 20 U/L — SIGNIFICANT CHANGE UP
BACTERIA # UR AUTO: ABNORMAL
BASOPHILS # BLD AUTO: 0 K/UL — SIGNIFICANT CHANGE UP (ref 0–0.2)
BASOPHILS NFR BLD AUTO: 0.1 % — SIGNIFICANT CHANGE UP (ref 0–2)
BILIRUB SERPL-MCNC: 0.5 MG/DL — SIGNIFICANT CHANGE UP (ref 0.4–2)
BILIRUB UR-MCNC: NEGATIVE — SIGNIFICANT CHANGE UP
BUN SERPL-MCNC: 24 MG/DL — HIGH (ref 8–20)
CALCIUM SERPL-MCNC: 9.6 MG/DL — SIGNIFICANT CHANGE UP (ref 8.6–10.2)
CHLORIDE SERPL-SCNC: 104 MMOL/L — SIGNIFICANT CHANGE UP (ref 98–107)
CO2 SERPL-SCNC: 25 MMOL/L — SIGNIFICANT CHANGE UP (ref 22–29)
COLOR SPEC: YELLOW — SIGNIFICANT CHANGE UP
CREAT SERPL-MCNC: 1.52 MG/DL — HIGH (ref 0.5–1.3)
DIFF PNL FLD: ABNORMAL
EOSINOPHIL # BLD AUTO: 0 K/UL — SIGNIFICANT CHANGE UP (ref 0–0.5)
EOSINOPHIL NFR BLD AUTO: 0.1 % — SIGNIFICANT CHANGE UP (ref 0–5)
EPI CELLS # UR: SIGNIFICANT CHANGE UP
GLUCOSE SERPL-MCNC: 114 MG/DL — SIGNIFICANT CHANGE UP (ref 70–115)
GLUCOSE UR QL: NEGATIVE MG/DL — SIGNIFICANT CHANGE UP
HCT VFR BLD CALC: 49 % — SIGNIFICANT CHANGE UP (ref 42–52)
HGB BLD-MCNC: 15.8 G/DL — SIGNIFICANT CHANGE UP (ref 14–18)
KETONES UR-MCNC: NEGATIVE — SIGNIFICANT CHANGE UP
LEUKOCYTE ESTERASE UR-ACNC: NEGATIVE — SIGNIFICANT CHANGE UP
LIDOCAIN IGE QN: 17 U/L — LOW (ref 22–51)
LYMPHOCYTES # BLD AUTO: 2.8 K/UL — SIGNIFICANT CHANGE UP (ref 1–4.8)
LYMPHOCYTES # BLD AUTO: 20.8 % — SIGNIFICANT CHANGE UP (ref 20–55)
MCHC RBC-ENTMCNC: 30.2 PG — SIGNIFICANT CHANGE UP (ref 27–31)
MCHC RBC-ENTMCNC: 32.2 G/DL — SIGNIFICANT CHANGE UP (ref 32–36)
MCV RBC AUTO: 93.5 FL — SIGNIFICANT CHANGE UP (ref 80–94)
MONOCYTES # BLD AUTO: 0.8 K/UL — SIGNIFICANT CHANGE UP (ref 0–0.8)
MONOCYTES NFR BLD AUTO: 6.2 % — SIGNIFICANT CHANGE UP (ref 3–10)
NEUTROPHILS # BLD AUTO: 9.8 K/UL — HIGH (ref 1.8–8)
NEUTROPHILS NFR BLD AUTO: 72.7 % — SIGNIFICANT CHANGE UP (ref 37–73)
NITRITE UR-MCNC: NEGATIVE — SIGNIFICANT CHANGE UP
PH UR: 6.5 — SIGNIFICANT CHANGE UP (ref 5–8)
PLATELET # BLD AUTO: 293 K/UL — SIGNIFICANT CHANGE UP (ref 150–400)
POTASSIUM SERPL-MCNC: 4.4 MMOL/L — SIGNIFICANT CHANGE UP (ref 3.5–5.3)
POTASSIUM SERPL-SCNC: 4.4 MMOL/L — SIGNIFICANT CHANGE UP (ref 3.5–5.3)
PROT SERPL-MCNC: 7.6 G/DL — SIGNIFICANT CHANGE UP (ref 6.6–8.7)
PROT UR-MCNC: 30 MG/DL
RBC # BLD: 5.24 M/UL — SIGNIFICANT CHANGE UP (ref 4.6–6.2)
RBC # FLD: 13.5 % — SIGNIFICANT CHANGE UP (ref 11–15.6)
RBC CASTS # UR COMP ASSIST: ABNORMAL /HPF (ref 0–4)
SODIUM SERPL-SCNC: 142 MMOL/L — SIGNIFICANT CHANGE UP (ref 135–145)
SP GR SPEC: 1.01 — SIGNIFICANT CHANGE UP (ref 1.01–1.02)
UROBILINOGEN FLD QL: NEGATIVE MG/DL — SIGNIFICANT CHANGE UP
WBC # BLD: 13.5 K/UL — HIGH (ref 4.8–10.8)
WBC # FLD AUTO: 13.5 K/UL — HIGH (ref 4.8–10.8)
WBC UR QL: SIGNIFICANT CHANGE UP

## 2018-03-17 PROCEDURE — 74176 CT ABD & PELVIS W/O CONTRAST: CPT | Mod: 26

## 2018-03-17 PROCEDURE — 96374 THER/PROPH/DIAG INJ IV PUSH: CPT

## 2018-03-17 PROCEDURE — 83690 ASSAY OF LIPASE: CPT

## 2018-03-17 PROCEDURE — 74176 CT ABD & PELVIS W/O CONTRAST: CPT

## 2018-03-17 PROCEDURE — 81001 URINALYSIS AUTO W/SCOPE: CPT

## 2018-03-17 PROCEDURE — 81003 URINALYSIS AUTO W/O SCOPE: CPT | Mod: QW

## 2018-03-17 PROCEDURE — 99284 EMERGENCY DEPT VISIT MOD MDM: CPT | Mod: 25

## 2018-03-17 PROCEDURE — 99214 OFFICE O/P EST MOD 30 MIN: CPT | Mod: 25

## 2018-03-17 PROCEDURE — 85027 COMPLETE CBC AUTOMATED: CPT

## 2018-03-17 PROCEDURE — 99284 EMERGENCY DEPT VISIT MOD MDM: CPT

## 2018-03-17 PROCEDURE — 80053 COMPREHEN METABOLIC PANEL: CPT

## 2018-03-17 PROCEDURE — 36415 COLL VENOUS BLD VENIPUNCTURE: CPT

## 2018-03-17 RX ORDER — TAMSULOSIN HYDROCHLORIDE 0.4 MG/1
0.4 CAPSULE ORAL ONCE
Qty: 0 | Refills: 0 | Status: COMPLETED | OUTPATIENT
Start: 2018-03-17 | End: 2018-03-17

## 2018-03-17 RX ORDER — KETOROLAC TROMETHAMINE 30 MG/ML
30 SYRINGE (ML) INJECTION ONCE
Qty: 0 | Refills: 0 | Status: DISCONTINUED | OUTPATIENT
Start: 2018-03-17 | End: 2018-03-17

## 2018-03-17 RX ORDER — TRAMADOL HYDROCHLORIDE 50 MG/1
1 TABLET ORAL
Qty: 18 | Refills: 0
Start: 2018-03-17 | End: 2018-03-19

## 2018-03-17 RX ORDER — TAMSULOSIN HYDROCHLORIDE 0.4 MG/1
1 CAPSULE ORAL
Qty: 15 | Refills: 0
Start: 2018-03-17 | End: 2018-03-31

## 2018-03-17 RX ADMIN — TAMSULOSIN HYDROCHLORIDE 0.4 MILLIGRAM(S): 0.4 CAPSULE ORAL at 16:26

## 2018-03-17 RX ADMIN — Medication 30 MILLIGRAM(S): at 16:26

## 2018-03-17 NOTE — ED PROVIDER NOTE - OBJECTIVE STATEMENT
68 yo M presented to ED with c/o RLQ pain x 3 days. Pt seen by PMD and sent in to r/o appy.  No N/V. No diarrhea, but reports issues with constipation. No CP or SOB. NO hematuria or dysuria. Some mild testicular pain but denies redness or swelling.

## 2018-03-17 NOTE — ED PROVIDER NOTE - ATTENDING CONTRIBUTION TO CARE
Seen with acp  c/o intermittent right lower quadrant pain that comes and goes currently asymptomatic  radiates to right flank  PE abd is soft non tender creatinine is 1.5 urine positive for blood  stone hunt shows proximal ureteral stone  patient in minimal pain  urology consulted  will follow up as outpatient  Agree with acps assessment hx and physsical

## 2018-03-17 NOTE — CONSULT NOTE ADULT - SUBJECTIVE AND OBJECTIVE BOX
UROLOGY SURGERY CONSULT NOTE  --------------------------------------------------------------------------------------------    Patient is a 67y old  Male who presents with a chief complaint of RLQ pain. Significant PMHx renal kidney stones x 3.     HPI: Patient presents to [facility] ED with 3 day hx of right sided abdominal pain. States he has had kidney stones in the past, and has had surgery to remove a stone before. Denies hematuria, dysuria. Denies nausea, vomiting, fevers.     Patient denies fevers/chills, denies lightheadedness/dizziness, denies SOB/chest pain, denies nausea/vomiting, denies constipation/diarrhea.      ROS: 10-system review is otherwise negative except HPI above.      PAST MEDICAL & SURGICAL HISTORY:  GERD (gastroesophageal reflux disease)  Meningitis  Measles  Nocturia  LETICIA (generalized anxiety disorder)  Facial nerve paralysis  BPH (benign prostatic hyperplasia)  AN (acoustic neuroma)  Calculus of kidney: Kidney stone  History of surgery: Rhytidectomy Superficial Muscloaponeurotic system  History of facial surgery  History of craniotomy  History of cranioplasty    FAMILY HISTORY:    [] Family history not pertinent as reviewed with the patient and family    SOCIAL HISTORY:  Denies smoking.     ALLERGIES: No Known Allergies      HOME MEDICATIONS:      CURRENT MEDICATIONS  MEDICATIONS (STANDING):   MEDICATIONS (PRN):  --------------------------------------------------------------------------------------------    Vitals:   T(C): 36.9 (18 @ 17:39), Max: 36.9 (18 @ 17:39)  HR: 88 (18 @ 17:39) (77 - 88)  BP: 146/93 (18 @ 17:39) (146/93 - 156/103)  BP(mean): --  RR: 18 (18 @ 17:39) (18 - 20)  SpO2: 99% (18 @ 17:39) (97% - 99%)  Wt(kg): --  CAPILLARY BLOOD GLUCOSE    Height (cm): 167.64 ( 11:57)  Weight (kg): 86.2 ( 11:57)  BMI (kg/m2): 30.7 ( 11:57)  BSA (m2): 1.96 ( 11:57)    PHYSICAL EXAM:   General: NAD, Lying in bed comfortably  Neuro: A+Ox3  HEENT: NC/AT, EOMI  Neck: Soft, supple  Cardio: RRR, nml S1/S2  Resp: Good effort, CTA b/l  GI/Abd: Soft, NT/ND, no rebound/guarding, no masses palpated  Skin: Intact, no breakdown  --------------------------------------------------------------------------------------------    LABS  CBC ( 14:23)                              15.8                           13.5<H>  )----------------(  293        72.7  % Neutrophils, 20.8  % Lymphocytes, ANC: 9.8<H>                              49.0      BMP ( 14:23)             142     |  104     |  24.0<H>		Ca++ --      Ca 9.6                ---------------------------------( 114   		Mg --                 4.4     |  25.0    |  1.52<H>			Ph --        LFTs ( 14:23)      TPro 7.6 / Alb 4.3 / TBili 0.5 / DBili -- / AST 20 / ALT 20 / AlkPhos 72      --------------------------------------------------------------------------------------------    MICROBIOLOGY  Urinalysis ( @ 14:28):     Color: Yellow / Appearance: Clear / S.015 / pH: 6.5 / Gluc: Negative / Ketones: Negative / Bili: Negative / Urobili: Negative / Protein :30<!> / Nitrites: Negative / Leuk.Est: Negative / RBC: 11-25<!> / WBC: 0-2 / Sq Epi:  / Non Sq Epi: Occasional / Bacteria Occasional<!>         --------------------------------------------------------------------------------------------    IMAGING  < from: CT Abdomen and Pelvis w/ Oral Cont (18 @ 15:40) >  IMPRESSION: Obstructing calculus in the right proximal ureter, measuring   0.9 x 0.7 cm with resultant moderate hydroureteronephrosis and   perinephric stranding.  < from: CT Abdomen and Pelvis w/ Oral Cont (18 @ 15:40) >  Obstructing calculus in the right proximal ureter,   measuring 0.9 x 0.7 cm. Moderate right hydronephrosis. Nonobstructing   left renal calculi, measuring up to 0.8 x 0.8 cm in the upper pole.   Posterior left renal cysts.    < end of copied text >      < end of copied text >        ASSESSMENT: Patient is a 67y old m with kidney stones found on CT.     PLAN:   - No acute immediate surgical intervention needed at this time  - Discussed patient and plan with Dr. Yanez, patient can follow-up with Dr. Yanez as outpatient being that he has no nausea/vomiting, fevers, uncontrolled pain.   - Please have patient f/u with Dr. Yanez within 1 week, sooner if he experiences any worsening symptoms

## 2018-03-18 ENCOUNTER — TRANSCRIPTION ENCOUNTER (OUTPATIENT)
Age: 68
End: 2018-03-18

## 2018-03-20 ENCOUNTER — OUTPATIENT (OUTPATIENT)
Dept: OUTPATIENT SERVICES | Facility: HOSPITAL | Age: 68
LOS: 1 days | End: 2018-03-20
Payer: MEDICARE

## 2018-03-20 ENCOUNTER — APPOINTMENT (OUTPATIENT)
Dept: RADIOLOGY | Facility: CLINIC | Age: 68
End: 2018-03-20
Payer: MEDICARE

## 2018-03-20 DIAGNOSIS — Z98.890 OTHER SPECIFIED POSTPROCEDURAL STATES: Chronic | ICD-10-CM

## 2018-03-20 DIAGNOSIS — Z00.8 ENCOUNTER FOR OTHER GENERAL EXAMINATION: ICD-10-CM

## 2018-03-20 DIAGNOSIS — N20.1 CALCULUS OF URETER: ICD-10-CM

## 2018-03-20 LAB
BACTERIA UR CULT: NORMAL
BILIRUB UR QL STRIP: NORMAL
CLARITY UR: CLEAR
COLLECTION METHOD: NORMAL
GLUCOSE UR-MCNC: NORMAL
HCG UR QL: 0.2 EU/DL
HGB UR QL STRIP.AUTO: NORMAL
KETONES UR-MCNC: NORMAL
LEUKOCYTE ESTERASE UR QL STRIP: NORMAL
NITRITE UR QL STRIP: NORMAL
PH UR STRIP: 6
PROT UR STRIP-MCNC: 30
SP GR UR STRIP: 1.03

## 2018-03-20 PROCEDURE — 74018 RADEX ABDOMEN 1 VIEW: CPT | Mod: 26

## 2018-03-20 PROCEDURE — 74018 RADEX ABDOMEN 1 VIEW: CPT

## 2018-03-22 ENCOUNTER — NON-APPOINTMENT (OUTPATIENT)
Age: 68
End: 2018-03-22

## 2018-03-22 ENCOUNTER — APPOINTMENT (OUTPATIENT)
Dept: FAMILY MEDICINE | Facility: CLINIC | Age: 68
End: 2018-03-22
Payer: MEDICARE

## 2018-03-22 VITALS
BODY MASS INDEX: 30.7 KG/M2 | HEIGHT: 66 IN | TEMPERATURE: 98 F | HEART RATE: 100 BPM | OXYGEN SATURATION: 98 % | WEIGHT: 191 LBS | SYSTOLIC BLOOD PRESSURE: 120 MMHG | RESPIRATION RATE: 12 BRPM | DIASTOLIC BLOOD PRESSURE: 70 MMHG

## 2018-03-22 LAB
INR PPP: 1 RATIO
POCT-PROTHROMBIN TIME: 12.4 SECS
QUALITY CONTROL: YES

## 2018-03-22 PROCEDURE — 36415 COLL VENOUS BLD VENIPUNCTURE: CPT

## 2018-03-22 PROCEDURE — 85610 PROTHROMBIN TIME: CPT | Mod: QW

## 2018-03-22 PROCEDURE — 99215 OFFICE O/P EST HI 40 MIN: CPT | Mod: 25

## 2018-03-22 PROCEDURE — 93000 ELECTROCARDIOGRAM COMPLETE: CPT | Mod: 59

## 2018-10-31 ENCOUNTER — APPOINTMENT (OUTPATIENT)
Dept: FAMILY MEDICINE | Facility: CLINIC | Age: 68
End: 2018-10-31
Payer: MEDICARE

## 2018-10-31 ENCOUNTER — LABORATORY RESULT (OUTPATIENT)
Age: 68
End: 2018-10-31

## 2018-10-31 VITALS
OXYGEN SATURATION: 99 % | TEMPERATURE: 98.1 F | HEIGHT: 66 IN | DIASTOLIC BLOOD PRESSURE: 78 MMHG | BODY MASS INDEX: 30.7 KG/M2 | RESPIRATION RATE: 12 BRPM | HEART RATE: 89 BPM | WEIGHT: 191 LBS | SYSTOLIC BLOOD PRESSURE: 116 MMHG

## 2018-10-31 DIAGNOSIS — Z01.818 ENCOUNTER FOR OTHER PREPROCEDURAL EXAMINATION: ICD-10-CM

## 2018-10-31 PROCEDURE — G0008: CPT | Mod: 59

## 2018-10-31 PROCEDURE — 90662 IIV NO PRSV INCREASED AG IM: CPT

## 2018-10-31 PROCEDURE — 99214 OFFICE O/P EST MOD 30 MIN: CPT | Mod: 25

## 2018-10-31 RX ORDER — TRAMADOL HYDROCHLORIDE 50 MG/1
50 TABLET, COATED ORAL
Refills: 0 | Status: DISCONTINUED | COMMUNITY
End: 2018-10-31

## 2018-10-31 RX ORDER — OXYCODONE HYDROCHLORIDE AND ACETAMINOPHEN 10; 325 MG/1; MG/1
10-325 TABLET ORAL
Refills: 0 | Status: DISCONTINUED | COMMUNITY
End: 2018-10-31

## 2018-11-01 LAB
ALBUMIN SERPL ELPH-MCNC: 4.5 G/DL
ALP BLD-CCNC: 77 U/L
ALT SERPL-CCNC: 20 U/L
ANION GAP SERPL CALC-SCNC: 12 MMOL/L
APPEARANCE: CLEAR
AST SERPL-CCNC: 19 U/L
BASOPHILS # BLD AUTO: 0.04 K/UL
BASOPHILS NFR BLD AUTO: 0.5 %
BILIRUB SERPL-MCNC: 0.4 MG/DL
BILIRUBIN URINE: NEGATIVE
BLOOD URINE: NEGATIVE
BUN SERPL-MCNC: 27 MG/DL
CALCIUM SERPL-MCNC: 9.7 MG/DL
CHLORIDE SERPL-SCNC: 106 MMOL/L
CO2 SERPL-SCNC: 25 MMOL/L
COLOR: YELLOW
CREAT SERPL-MCNC: 1.06 MG/DL
EOSINOPHIL # BLD AUTO: 0.07 K/UL
EOSINOPHIL NFR BLD AUTO: 0.8 %
ERYTHROCYTE [SEDIMENTATION RATE] IN BLOOD BY WESTERGREN METHOD: 8 MM/HR
GLUCOSE QUALITATIVE U: NEGATIVE MG/DL
GLUCOSE SERPL-MCNC: 74 MG/DL
HCT VFR BLD CALC: 50.1 %
HGB BLD-MCNC: 16.1 G/DL
IMM GRANULOCYTES NFR BLD AUTO: 0.2 %
KETONES URINE: NEGATIVE
LEUKOCYTE ESTERASE URINE: NEGATIVE
LYMPHOCYTES # BLD AUTO: 3.18 K/UL
LYMPHOCYTES NFR BLD AUTO: 36.8 %
MAN DIFF?: NORMAL
MCHC RBC-ENTMCNC: 30.7 PG
MCHC RBC-ENTMCNC: 32.1 GM/DL
MCV RBC AUTO: 95.4 FL
MONOCYTES # BLD AUTO: 0.49 K/UL
MONOCYTES NFR BLD AUTO: 5.7 %
NEUTROPHILS # BLD AUTO: 4.84 K/UL
NEUTROPHILS NFR BLD AUTO: 56 %
NITRITE URINE: NEGATIVE
PH URINE: 5.5
PLATELET # BLD AUTO: 298 K/UL
POTASSIUM SERPL-SCNC: 4.6 MMOL/L
PROT SERPL-MCNC: 6.9 G/DL
PROTEIN URINE: ABNORMAL MG/DL
RBC # BLD: 5.25 M/UL
RBC # FLD: 14.2 %
SODIUM SERPL-SCNC: 143 MMOL/L
SPECIFIC GRAVITY URINE: 1.03
TSH SERPL-ACNC: 1.78 UIU/ML
UROBILINOGEN URINE: NEGATIVE MG/DL
WBC # FLD AUTO: 8.64 K/UL

## 2018-11-05 NOTE — ED PROVIDER NOTE - GASTROINTESTINAL [+], MLM
Per Dr Stan Mcgill:    I can see her on the 20th at 8 am- l believe she is on vacation though or the 27th at 8 am   I am out of the town the week following her imaging ABDOMINAL PAIN

## 2018-11-06 NOTE — REVIEW OF SYSTEMS
[Negative] : Heme/Lymph [Fever] : no fever [Chills] : no chills [Hot Flashes] : no hot flashes [Pain] : no pain [Redness] : no redness [Itching] : no itching [Chest Pain] : no chest pain [Palpitations] : no palpitations [Abdominal Pain] : no abdominal pain [Constipation] : no constipation [Melena] : no melena [Incontinence] : no incontinence [Hesitancy] : no hesitancy [Frequency] : no frequency [Joint Pain] : no joint pain [Joint Stiffness] : no joint stiffness [Muscle Pain] : no muscle pain [Back Pain] : no back pain [Nail Changes] : no nail changes [Skin Rash] : no skin rash [Dizziness] : no dizziness [Fainting] : no fainting [Suicidal] : not suicidal [Insomnia] : no insomnia [Anxiety] : no anxiety [Depression] : no depression

## 2018-11-06 NOTE — PLAN
[FreeTextEntry1] : Pt. Here for follow up of LETICIA HTN BPH  BW and rx refills feels good  still with no active stress mgt Labs reviewed meds given  I stop reviewed\par Care plan reviewed  diet exercise stress mgt\par RTC 3 months

## 2018-11-06 NOTE — HISTORY OF PRESENT ILLNESS
[FreeTextEntry1] : Pt. Here for follow up of LETICIA HTN BPH  BW and rx refills feels good  still with no active stress mgt  [de-identified] : Pt. present today for maintence bw and rx renewal.

## 2018-11-06 NOTE — HEALTH RISK ASSESSMENT
[No falls in past year] : Patient reported no falls in the past year [0] : 2) Feeling down, depressed, or hopeless: Not at all (0) [] : No [UQU0Symdx] : 0

## 2018-11-06 NOTE — ASSESSMENT
[FreeTextEntry1] : Labs reviewed meds given  I stop reviewed\par Care plan reviewed  diet exercise stress mgt\par RTC 3 months \par

## 2018-11-07 LAB
CHOLEST SERPL-MCNC: 155 MG/DL
CHOLEST/HDLC SERPL: 2.8 RATIO
HDLC SERPL-MCNC: 56 MG/DL
LDLC SERPL CALC-MCNC: 80 MG/DL
TRIGL SERPL-MCNC: 95 MG/DL

## 2018-12-13 ENCOUNTER — TRANSCRIPTION ENCOUNTER (OUTPATIENT)
Age: 68
End: 2018-12-13

## 2019-01-30 ENCOUNTER — APPOINTMENT (OUTPATIENT)
Dept: FAMILY MEDICINE | Facility: CLINIC | Age: 69
End: 2019-01-30
Payer: MEDICARE

## 2019-01-30 VITALS
RESPIRATION RATE: 12 BRPM | HEIGHT: 66 IN | OXYGEN SATURATION: 99 % | WEIGHT: 190 LBS | BODY MASS INDEX: 30.53 KG/M2 | TEMPERATURE: 99.2 F | HEART RATE: 89 BPM | SYSTOLIC BLOOD PRESSURE: 126 MMHG | DIASTOLIC BLOOD PRESSURE: 76 MMHG

## 2019-01-30 LAB — HBA1C MFR BLD HPLC: 5.6

## 2019-01-30 PROCEDURE — 99214 OFFICE O/P EST MOD 30 MIN: CPT | Mod: 25

## 2019-01-30 PROCEDURE — 93000 ELECTROCARDIOGRAM COMPLETE: CPT | Mod: 59

## 2019-01-30 PROCEDURE — 83036 HEMOGLOBIN GLYCOSYLATED A1C: CPT | Mod: QW

## 2019-01-30 NOTE — HISTORY OF PRESENT ILLNESS
[FreeTextEntry1] : Patient in for follow up of palpitation under stress that wife with drug problem going to rehab again today\par Had CPE with FD and had EKG with PAC PVC  patient had work up about a year ago with DR Bejarano  Cardio \par Under a lot of stress

## 2019-01-30 NOTE — PLAN
[FreeTextEntry1] : Patient in for follow up of palpitation under stress that wife with drug problem going to rehab again today\par Had CPE with FD and had EKG with PAC PVC  patient had work up about a year ago with DR Bejarano  Cardio \par Under a lot of stress \par EKG  reviewed labs and stress mgt reviewed RTC 2 week meds given and cardiac eval for monitor

## 2019-01-30 NOTE — COUNSELING
[Weight management counseling provided] : Weight management [Healthy eating counseling provided] : healthy eating [Activity counseling provided] : activity [Behavioral health counseling provided] : behavioral health  [Keep Food Diary] : Keep food diary [Low Salt Diet] : Low salt diet [None] : None

## 2019-01-30 NOTE — REVIEW OF SYSTEMS
[Fever] : no fever [Chills] : no chills [Hot Flashes] : no hot flashes [Pain] : no pain [Redness] : no redness [Itching] : no itching [Chest Pain] : no chest pain [Palpitations] : palpitations [Claudication] : no  leg claudication [Lower Ext Edema] : no lower extremity edema [Orthopena] : no orthopnea [Paroysmal Nocturnal Dyspnea] : no paroysmal nocturnal dyspnea [Shortness Of Breath] : no shortness of breath [Wheezing] : no wheezing [Cough] : no cough [Dyspnea on Exertion] : not dyspnea on exertion [Abdominal Pain] : no abdominal pain [Constipation] : no constipation [Melena] : no melena [Incontinence] : no incontinence [Hesitancy] : no hesitancy [Frequency] : no frequency [Joint Pain] : no joint pain [Joint Stiffness] : no joint stiffness [Muscle Pain] : no muscle pain [Back Pain] : no back pain [Nail Changes] : no nail changes [Skin Rash] : no skin rash [Dizziness] : no dizziness [Fainting] : no fainting [Suicidal] : not suicidal [Insomnia] : no insomnia [Anxiety] : anxiety [Depression] : no depression [Negative] : Heme/Lymph

## 2019-01-30 NOTE — HEALTH RISK ASSESSMENT
[] : No [No falls in past year] : Patient reported no falls in the past year [0] : 2) Feeling down, depressed, or hopeless: Not at all (0) [[id number]] : 0

## 2019-02-20 ENCOUNTER — NON-APPOINTMENT (OUTPATIENT)
Age: 69
End: 2019-02-20

## 2019-02-20 ENCOUNTER — APPOINTMENT (OUTPATIENT)
Dept: FAMILY MEDICINE | Facility: CLINIC | Age: 69
End: 2019-02-20
Payer: MEDICARE

## 2019-02-20 VITALS
SYSTOLIC BLOOD PRESSURE: 110 MMHG | BODY MASS INDEX: 30.53 KG/M2 | TEMPERATURE: 97.7 F | WEIGHT: 190 LBS | RESPIRATION RATE: 13 BRPM | DIASTOLIC BLOOD PRESSURE: 70 MMHG | HEIGHT: 66 IN | OXYGEN SATURATION: 96 % | HEART RATE: 68 BPM

## 2019-02-20 DIAGNOSIS — Z87.442 PERSONAL HISTORY OF URINARY CALCULI: ICD-10-CM

## 2019-02-20 DIAGNOSIS — Z92.29 PERSONAL HISTORY OF OTHER DRUG THERAPY: ICD-10-CM

## 2019-02-20 DIAGNOSIS — R10.9 UNSPECIFIED ABDOMINAL PAIN: ICD-10-CM

## 2019-02-20 PROCEDURE — 93000 ELECTROCARDIOGRAM COMPLETE: CPT | Mod: 59

## 2019-02-20 PROCEDURE — 99214 OFFICE O/P EST MOD 30 MIN: CPT | Mod: 25

## 2019-02-20 NOTE — HISTORY OF PRESENT ILLNESS
[FreeTextEntry1] : Patient in for follow up of HTN HLD  CAD, had intermit ant Palpations awaiting follow up with Cardio.  patient states has a lot of stress wife out of rehab, and started drinking again. has tried to make changes with stress mgt

## 2019-02-20 NOTE — PLAN
[FreeTextEntry1] : Patient in for follow up of HTN HLD  CAD, had intermit ant Palpations awaiting follow up with Cardio.  patient states has a lot of stress wife out of rehab, and started drinking again. has tried to make changes with stress mgt \par EKG reviewed labs reviewed  RTC 1 month

## 2019-02-20 NOTE — HEALTH RISK ASSESSMENT
[No falls in past year] : Patient reported no falls in the past year [0] : 2) Feeling down, depressed, or hopeless: Not at all (0) [] : No [[id number]] : 0

## 2019-02-20 NOTE — REVIEW OF SYSTEMS
[Palpitations] : palpitations [Anxiety] : anxiety [Negative] : Heme/Lymph [Fever] : no fever [Chills] : no chills [Hot Flashes] : no hot flashes [Pain] : no pain [Redness] : no redness [Itching] : no itching [Chest Pain] : no chest pain [Claudication] : no  leg claudication [Lower Ext Edema] : no lower extremity edema [Orthopena] : no orthopnea [Paroysmal Nocturnal Dyspnea] : no paroysmal nocturnal dyspnea [Shortness Of Breath] : no shortness of breath [Wheezing] : no wheezing [Cough] : no cough [Dyspnea on Exertion] : not dyspnea on exertion [Abdominal Pain] : no abdominal pain [Constipation] : no constipation [Melena] : no melena [Incontinence] : no incontinence [Hesitancy] : no hesitancy [Frequency] : no frequency [Joint Pain] : no joint pain [Joint Stiffness] : no joint stiffness [Muscle Pain] : no muscle pain [Back Pain] : no back pain [Itching] : no itching [Nail Changes] : no nail changes [Skin Rash] : no skin rash [Dizziness] : no dizziness [Fainting] : no fainting [Suicidal] : not suicidal [Insomnia] : no insomnia [Depression] : no depression

## 2019-03-18 NOTE — ASU PATIENT PROFILE, ADULT - ALCOHOL USE HISTORY SINGLE SELECT
PSYCHIATRY CLINIC PHONE INTAKE     SERVICES REQUESTED / INTERESTED IN          Med Management    Presenting Problem and Brief History                              What would you like to be seen for? (brief description):  Patient has been very hypomanic, had her first episode in 2012, very agitated, idealogic changes. She has not had any history of violence or psychotic episodes.  restrained her from hurting herself last week but she has not recently had other issues with self-harm. Patient has a psychiatrist in [place] at [facility] but that provider is unable to prescribe controlled substances in a different state. She seems to be feeding off of stress but is currently experiencing far more dysphoria than  has ever seen before. Patient doesn't seem to be a danger to herself and  feels a hospitalization might be worse for her.  Have you received a mental health diagnosis? Yes   Which one (s): Bipolar 2  Is there any history of developmental delay?  No   Are you currently seeing a mental health provider?  Yes            Who / month last seen:  Maddie Chaidez, [facility]  Do you have mental health records elsewhere?  Yes  Will you sign a release so we can obtain them?  Yes    Have you ever been hospitalized for psychiatric reasons?  No  Describe:      Do you have current thoughts of self-harm?  History of self-harm but nothing recently other than last week  Do you currently have thoughts of harming others?  No       Substance Use History     Do you have any history of alcohol / illicit drug use?  No  Describe:    Have you ever received treatment for this?  No    Describe:       Social History     Does the patient have a guardian?  No    Name / number:   Have you had an ACT team in last 12 months?  No  Describe:    Do you have any current or past legal issues?  No  Describe:    OK to leave a detailed voicemail?  Yes    Medical/ Surgical History                                 There  is no problem list on file for this patient.         Medications             No current outpatient medications on file.     Klonopin 0.5mg    DISPOSITION      Completed phone screen with patient's . Scheduled with Dr. Monteiro.    Lorin Martínez,    yes...

## 2019-03-26 ENCOUNTER — APPOINTMENT (OUTPATIENT)
Dept: CARDIOLOGY | Facility: CLINIC | Age: 69
End: 2019-03-26

## 2019-05-15 ENCOUNTER — TRANSCRIPTION ENCOUNTER (OUTPATIENT)
Age: 69
End: 2019-05-15

## 2019-10-04 NOTE — ASU PATIENT PROFILE, ADULT - CAREGIVER
OT/CR: Attempted OT/CR evaluation and gathered subjective information. Pt reported headache pain 8/10 and declined evaluation at this time. RN aware of pain.    Declines

## 2019-11-07 ENCOUNTER — APPOINTMENT (OUTPATIENT)
Dept: FAMILY MEDICINE | Facility: CLINIC | Age: 69
End: 2019-11-07
Payer: MEDICARE

## 2019-11-07 ENCOUNTER — LABORATORY RESULT (OUTPATIENT)
Age: 69
End: 2019-11-07

## 2019-11-07 VITALS
HEIGHT: 66 IN | BODY MASS INDEX: 30.53 KG/M2 | DIASTOLIC BLOOD PRESSURE: 70 MMHG | SYSTOLIC BLOOD PRESSURE: 124 MMHG | OXYGEN SATURATION: 98 % | WEIGHT: 190 LBS | RESPIRATION RATE: 13 BRPM | HEART RATE: 74 BPM

## 2019-11-07 PROCEDURE — 36415 COLL VENOUS BLD VENIPUNCTURE: CPT

## 2019-11-07 PROCEDURE — 99214 OFFICE O/P EST MOD 30 MIN: CPT | Mod: 25

## 2019-11-07 NOTE — COUNSELING
[Fall prevention counseling provided] : Fall prevention counseling provided [Use proper foot wear] : Use proper foot wear [Behavioral health counseling provided] : Behavioral health counseling provided [Engage in a relaxing activity] : Engage in a relaxing activity

## 2019-11-07 NOTE — PLAN
[FreeTextEntry1] : Patient in for follow up of HTN HLD  CAD, had chronic eye pain since reconstructive surgery is bothered by light, had prior hardware removed from site also had CT 3 months ago with reconstructive surgery\par \par Under stress Wife is dying at home \par Optho eval Labs   Had flu shot [place] last month \par  labs reviewed  RTC 1 month

## 2019-11-07 NOTE — PHYSICAL EXAM
[No Acute Distress] : no acute distress [Well Nourished] : well nourished [Well Developed] : well developed [Well-Appearing] : well-appearing [PERRL] : pupils equal round and reactive to light [Normal Sclera/Conjunctiva] : normal sclera/conjunctiva [Normal Outer Ear/Nose] : the outer ears and nose were normal in appearance [EOMI] : extraocular movements intact [Normal Oropharynx] : the oropharynx was normal [No Lymphadenopathy] : no lymphadenopathy [No JVD] : no jugular venous distention [Supple] : supple [No Respiratory Distress] : no respiratory distress  [Thyroid Normal, No Nodules] : the thyroid was normal and there were no nodules present [Clear to Auscultation] : lungs were clear to auscultation bilaterally [No Accessory Muscle Use] : no accessory muscle use [Normal Rate] : normal rate  [Regular Rhythm] : with a regular rhythm [Normal S1, S2] : normal S1 and S2 [No Murmur] : no murmur heard [No Carotid Bruits] : no carotid bruits [No Abdominal Bruit] : a ~M bruit was not heard ~T in the abdomen [No Varicosities] : no varicosities [No Edema] : there was no peripheral edema [Pedal Pulses Present] : the pedal pulses are present [No Palpable Aorta] : no palpable aorta [No Extremity Clubbing/Cyanosis] : no extremity clubbing/cyanosis [Soft] : abdomen soft [Non Tender] : non-tender [Non-distended] : non-distended [No Masses] : no abdominal mass palpated [No HSM] : no HSM [Normal Bowel Sounds] : normal bowel sounds [Normal Posterior Cervical Nodes] : no posterior cervical lymphadenopathy [Normal Anterior Cervical Nodes] : no anterior cervical lymphadenopathy [No CVA Tenderness] : no CVA  tenderness [No Spinal Tenderness] : no spinal tenderness [No Joint Swelling] : no joint swelling [Grossly Normal Strength/Tone] : grossly normal strength/tone [No Rash] : no rash [Coordination Grossly Intact] : coordination grossly intact [No Focal Deficits] : no focal deficits [Normal Gait] : normal gait [Normal Affect] : the affect was normal [Deep Tendon Reflexes (DTR)] : deep tendon reflexes were 2+ and symmetric [Normal Insight/Judgement] : insight and judgment were intact

## 2019-11-07 NOTE — HEALTH RISK ASSESSMENT
[No falls in past year] : Patient reported no falls in the past year [0] : 2) Feeling down, depressed, or hopeless: Not at all (0) [[id number]] : 0

## 2019-11-07 NOTE — HISTORY OF PRESENT ILLNESS
[FreeTextEntry1] : Patient in for follow up of HTN HLD  CAD, had chronic eye pain since reconstructive surgery is bothered by light, had prior hardware removed from site also had CT 3 months ago with reconstructive surgery

## 2019-11-07 NOTE — REVIEW OF SYSTEMS
[Palpitations] : palpitations [Anxiety] : anxiety [Negative] : Heme/Lymph [Fever] : no fever [Chills] : no chills [Hot Flashes] : no hot flashes [Pain] : no pain [Redness] : no redness [Itching] : no itching [Chest Pain] : no chest pain [Claudication] : no  leg claudication [Lower Ext Edema] : no lower extremity edema [Orthopena] : no orthopnea [Shortness Of Breath] : no shortness of breath [Wheezing] : no wheezing [Cough] : no cough [Dyspnea on Exertion] : not dyspnea on exertion [Abdominal Pain] : no abdominal pain [Constipation] : no constipation [Incontinence] : no incontinence [Melena] : no melena [Hesitancy] : no hesitancy [Frequency] : no frequency [Joint Pain] : no joint pain [Joint Stiffness] : no joint stiffness [Muscle Pain] : no muscle pain [Back Pain] : no back pain [Nail Changes] : no nail changes [Skin Rash] : no skin rash [Dizziness] : no dizziness [Fainting] : no fainting [Suicidal] : not suicidal [Depression] : no depression [Insomnia] : no insomnia

## 2019-11-08 ENCOUNTER — TRANSCRIPTION ENCOUNTER (OUTPATIENT)
Age: 69
End: 2019-11-08

## 2019-11-08 LAB
ALBUMIN SERPL ELPH-MCNC: 4.7 G/DL
ALP BLD-CCNC: 82 U/L
ALT SERPL-CCNC: 19 U/L
ANION GAP SERPL CALC-SCNC: 14 MMOL/L
APPEARANCE: CLEAR
AST SERPL-CCNC: 19 U/L
B BURGDOR IGG+IGM SER QL IB: NORMAL
BASOPHILS # BLD AUTO: 0.05 K/UL
BASOPHILS NFR BLD AUTO: 0.6 %
BILIRUB SERPL-MCNC: 0.5 MG/DL
BILIRUBIN URINE: NEGATIVE
BLOOD URINE: NORMAL
BUN SERPL-MCNC: 26 MG/DL
CALCIUM SERPL-MCNC: 9.6 MG/DL
CHLORIDE SERPL-SCNC: 108 MMOL/L
CO2 SERPL-SCNC: 23 MMOL/L
COLOR: YELLOW
CREAT SERPL-MCNC: 1.11 MG/DL
ENA SS-A AB SER IA-ACNC: <0.2 AL
ENA SS-B AB SER IA-ACNC: <0.2 AL
EOSINOPHIL # BLD AUTO: 0.08 K/UL
EOSINOPHIL NFR BLD AUTO: 1 %
ERYTHROCYTE [SEDIMENTATION RATE] IN BLOOD BY WESTERGREN METHOD: 11 MM/HR
ESTIMATED AVERAGE GLUCOSE: 114 MG/DL
FOLATE SERPL-MCNC: 13.2 NG/ML
GLUCOSE QUALITATIVE U: NEGATIVE
GLUCOSE SERPL-MCNC: 127 MG/DL
HBA1C MFR BLD HPLC: 5.6 %
HCT VFR BLD CALC: 52 %
HGB BLD-MCNC: 16.3 G/DL
IMM GRANULOCYTES NFR BLD AUTO: 0.2 %
KETONES URINE: NEGATIVE
LEUKOCYTE ESTERASE URINE: NEGATIVE
LYMPHOCYTES # BLD AUTO: 2.96 K/UL
LYMPHOCYTES NFR BLD AUTO: 36.7 %
MAN DIFF?: NORMAL
MCHC RBC-ENTMCNC: 30.4 PG
MCHC RBC-ENTMCNC: 31.3 GM/DL
MCV RBC AUTO: 97 FL
MONOCYTES # BLD AUTO: 0.45 K/UL
MONOCYTES NFR BLD AUTO: 5.6 %
NEUTROPHILS # BLD AUTO: 4.51 K/UL
NEUTROPHILS NFR BLD AUTO: 55.9 %
NITRITE URINE: NEGATIVE
PH URINE: 6
PLATELET # BLD AUTO: 283 K/UL
POTASSIUM SERPL-SCNC: 4.6 MMOL/L
PROT SERPL-MCNC: 7.2 G/DL
PROTEIN URINE: NORMAL
PSA SERPL-MCNC: 1.36 NG/ML
RBC # BLD: 5.36 M/UL
RBC # FLD: 13.9 %
RHEUMATOID FACT SER QL: <10 IU/ML
SODIUM SERPL-SCNC: 145 MMOL/L
SPECIFIC GRAVITY URINE: 1.03
TESTOST SERPL-MCNC: 326 NG/DL
TSH SERPL-ACNC: 1.8 UIU/ML
URATE SERPL-MCNC: 5.5 MG/DL
UROBILINOGEN URINE: NORMAL
VIT B12 SERPL-MCNC: 640 PG/ML
WBC # FLD AUTO: 8.07 K/UL

## 2019-11-11 LAB
CCP AB SER IA-ACNC: 11 UNITS
RF+CCP IGG SER-IMP: NEGATIVE

## 2019-11-12 LAB
ANA SER IF-ACNC: NEGATIVE
CHOLEST SERPL-MCNC: 168 MG/DL
CHOLEST/HDLC SERPL: 2.5 RATIO
HDLC SERPL-MCNC: 68 MG/DL
LDLC SERPL CALC-MCNC: 87 MG/DL
TRIGL SERPL-MCNC: 67 MG/DL

## 2019-12-02 ENCOUNTER — APPOINTMENT (OUTPATIENT)
Dept: FAMILY MEDICINE | Facility: CLINIC | Age: 69
End: 2019-12-02
Payer: MEDICARE

## 2019-12-02 VITALS
DIASTOLIC BLOOD PRESSURE: 86 MMHG | TEMPERATURE: 98.5 F | RESPIRATION RATE: 14 BRPM | HEART RATE: 115 BPM | SYSTOLIC BLOOD PRESSURE: 130 MMHG | OXYGEN SATURATION: 97 %

## 2019-12-02 PROCEDURE — 99214 OFFICE O/P EST MOD 30 MIN: CPT | Mod: 25

## 2019-12-02 PROCEDURE — 81003 URINALYSIS AUTO W/O SCOPE: CPT | Mod: QW

## 2019-12-02 NOTE — PLAN
[FreeTextEntry1] : Fever and temp 4 days 101  green sputum taking DayQuil and NyQuil feels bad, declines all blood labs and kaden\par POCT UA\par Meds and care plan\par RTC 3=5 days

## 2019-12-02 NOTE — HISTORY OF PRESENT ILLNESS
[FreeTextEntry1] : Fever and temp 4 days 101  green sputum taking DayQuil and NyQuil feels bad, declines all blood labs and kaden

## 2019-12-02 NOTE — REVIEW OF SYSTEMS
[Palpitations] : palpitations [Anxiety] : anxiety [Negative] : Heme/Lymph [Fever] : no fever [Chills] : no chills [Hot Flashes] : no hot flashes [Pain] : no pain [Redness] : no redness [Itching] : no itching [Chest Pain] : no chest pain [Claudication] : no  leg claudication [Lower Ext Edema] : no lower extremity edema [Shortness Of Breath] : no shortness of breath [Orthopena] : no orthopnea [Wheezing] : no wheezing [Cough] : no cough [Abdominal Pain] : no abdominal pain [Dyspnea on Exertion] : not dyspnea on exertion [Constipation] : no constipation [Incontinence] : no incontinence [Melena] : no melena [Hesitancy] : no hesitancy [Frequency] : no frequency [Joint Pain] : no joint pain [Muscle Pain] : no muscle pain [Joint Stiffness] : no joint stiffness [Back Pain] : no back pain [Nail Changes] : no nail changes [Skin Rash] : no skin rash [Dizziness] : no dizziness [Fainting] : no fainting [Suicidal] : not suicidal [Insomnia] : no insomnia [Depression] : no depression

## 2019-12-02 NOTE — PHYSICAL EXAM
[No Acute Distress] : no acute distress [Well Developed] : well developed [Well Nourished] : well nourished [Well-Appearing] : well-appearing [Normal Sclera/Conjunctiva] : normal sclera/conjunctiva [PERRL] : pupils equal round and reactive to light [EOMI] : extraocular movements intact [Normal Outer Ear/Nose] : the outer ears and nose were normal in appearance [Normal Oropharynx] : the oropharynx was normal [No JVD] : no jugular venous distention [No Lymphadenopathy] : no lymphadenopathy [Supple] : supple [Thyroid Normal, No Nodules] : the thyroid was normal and there were no nodules present [No Respiratory Distress] : no respiratory distress  [No Accessory Muscle Use] : no accessory muscle use [Normal Rate] : normal rate  [Clear to Auscultation] : lungs were clear to auscultation bilaterally [Normal S1, S2] : normal S1 and S2 [Regular Rhythm] : with a regular rhythm [No Murmur] : no murmur heard [No Abdominal Bruit] : a ~M bruit was not heard ~T in the abdomen [No Carotid Bruits] : no carotid bruits [Pedal Pulses Present] : the pedal pulses are present [No Varicosities] : no varicosities [No Edema] : there was no peripheral edema [No Palpable Aorta] : no palpable aorta [No Extremity Clubbing/Cyanosis] : no extremity clubbing/cyanosis [Soft] : abdomen soft [Non Tender] : non-tender [Non-distended] : non-distended [No Masses] : no abdominal mass palpated [No HSM] : no HSM [Normal Posterior Cervical Nodes] : no posterior cervical lymphadenopathy [Normal Bowel Sounds] : normal bowel sounds [Normal Anterior Cervical Nodes] : no anterior cervical lymphadenopathy [No CVA Tenderness] : no CVA  tenderness [No Spinal Tenderness] : no spinal tenderness [Grossly Normal Strength/Tone] : grossly normal strength/tone [No Joint Swelling] : no joint swelling [No Rash] : no rash [No Focal Deficits] : no focal deficits [Coordination Grossly Intact] : coordination grossly intact [Normal Gait] : normal gait [Deep Tendon Reflexes (DTR)] : deep tendon reflexes were 2+ and symmetric [Normal Affect] : the affect was normal [Normal Insight/Judgement] : insight and judgment were intact

## 2019-12-03 ENCOUNTER — APPOINTMENT (OUTPATIENT)
Dept: FAMILY MEDICINE | Facility: CLINIC | Age: 69
End: 2019-12-03
Payer: MEDICARE

## 2019-12-03 ENCOUNTER — NON-APPOINTMENT (OUTPATIENT)
Age: 69
End: 2019-12-03

## 2019-12-03 DIAGNOSIS — Z87.898 PERSONAL HISTORY OF OTHER SPECIFIED CONDITIONS: ICD-10-CM

## 2019-12-03 LAB
BILIRUB UR QL STRIP: NORMAL
CLARITY UR: CLEAR
COLLECTION METHOD: NORMAL
GLUCOSE UR-MCNC: NORMAL
HCG UR QL: 0.2 EU/DL
HGB UR QL STRIP.AUTO: NORMAL
KETONES UR-MCNC: NORMAL
LEUKOCYTE ESTERASE UR QL STRIP: NORMAL
NITRITE UR QL STRIP: NORMAL
PH UR STRIP: 6
PROT UR STRIP-MCNC: NORMAL
SP GR UR STRIP: >=1.03

## 2019-12-03 PROCEDURE — 99214 OFFICE O/P EST MOD 30 MIN: CPT | Mod: 25

## 2019-12-03 PROCEDURE — 93000 ELECTROCARDIOGRAM COMPLETE: CPT | Mod: 59

## 2019-12-03 PROCEDURE — 36415 COLL VENOUS BLD VENIPUNCTURE: CPT

## 2019-12-03 RX ORDER — CEFTRIAXONE 1 G/1
1 INJECTION, POWDER, FOR SOLUTION INTRAMUSCULAR; INTRAVENOUS
Qty: 0 | Refills: 0 | Status: COMPLETED | OUTPATIENT
Start: 2019-12-03

## 2019-12-03 RX ORDER — DOXYCYCLINE 100 MG/1
100 TABLET, FILM COATED ORAL TWICE DAILY
Qty: 28 | Refills: 0 | Status: DISCONTINUED | COMMUNITY
Start: 2019-12-02 | End: 2019-12-03

## 2019-12-03 RX ADMIN — CEFTRIAXONE GM: 100 INJECTION, POWDER, FOR SOLUTION INTRAVENOUS at 00:00

## 2019-12-03 NOTE — ASSESSMENT
[FreeTextEntry1] : Patient given Rocephin 1 Gm IM in left gluteal region by RN.  He tolerated well, consent signed, denies any adverse reaction to injections.  MAGAN,RN

## 2019-12-03 NOTE — PHYSICAL EXAM
[No Acute Distress] : no acute distress [Well Nourished] : well nourished [Well Developed] : well developed [Well-Appearing] : well-appearing [PERRL] : pupils equal round and reactive to light [Normal Sclera/Conjunctiva] : normal sclera/conjunctiva [EOMI] : extraocular movements intact [Normal Outer Ear/Nose] : the outer ears and nose were normal in appearance [Normal Oropharynx] : the oropharynx was normal [No Lymphadenopathy] : no lymphadenopathy [No JVD] : no jugular venous distention [Supple] : supple [Thyroid Normal, No Nodules] : the thyroid was normal and there were no nodules present [No Respiratory Distress] : no respiratory distress  [No Accessory Muscle Use] : no accessory muscle use [Clear to Auscultation] : lungs were clear to auscultation bilaterally [Normal Rate] : normal rate  [Regular Rhythm] : with a regular rhythm [Normal S1, S2] : normal S1 and S2 [No Carotid Bruits] : no carotid bruits [No Murmur] : no murmur heard [No Abdominal Bruit] : a ~M bruit was not heard ~T in the abdomen [No Varicosities] : no varicosities [Pedal Pulses Present] : the pedal pulses are present [No Edema] : there was no peripheral edema [No Palpable Aorta] : no palpable aorta [No Extremity Clubbing/Cyanosis] : no extremity clubbing/cyanosis [Soft] : abdomen soft [Non Tender] : non-tender [Non-distended] : non-distended [No Masses] : no abdominal mass palpated [No HSM] : no HSM [Normal Bowel Sounds] : normal bowel sounds [Normal Posterior Cervical Nodes] : no posterior cervical lymphadenopathy [Normal Anterior Cervical Nodes] : no anterior cervical lymphadenopathy [No CVA Tenderness] : no CVA  tenderness [No Joint Swelling] : no joint swelling [No Spinal Tenderness] : no spinal tenderness [Grossly Normal Strength/Tone] : grossly normal strength/tone [No Rash] : no rash [Coordination Grossly Intact] : coordination grossly intact [No Focal Deficits] : no focal deficits [Normal Gait] : normal gait [Deep Tendon Reflexes (DTR)] : deep tendon reflexes were 2+ and symmetric [Normal Affect] : the affect was normal [Normal Insight/Judgement] : insight and judgment were intact

## 2019-12-03 NOTE — COUNSELING
[Behavioral health counseling provided] : Behavioral health counseling provided [Fall prevention counseling provided] : Fall prevention counseling provided [None] : None [Good understanding] : Patient has a good understanding of lifestyle changes and steps needed to achieve self management goal

## 2019-12-03 NOTE — PLAN
[FreeTextEntry1] : Fever and temp 4 days 101  green sputum taking Abx feels bad allows blood and EKG will agree to CXR \par Meds and care plan\par Thursday  rapid flu negative\par \par new Abx and IM Ceftriaxone \par If any severe change go to ER\par Stat CXR

## 2019-12-03 NOTE — REVIEW OF SYSTEMS
[Palpitations] : palpitations [Anxiety] : anxiety [Negative] : Heme/Lymph [Fever] : no fever [Hot Flashes] : no hot flashes [Chills] : no chills [Itching] : no itching [Redness] : no redness [Pain] : no pain [Claudication] : no  leg claudication [Chest Pain] : no chest pain [Lower Ext Edema] : no lower extremity edema [Orthopena] : no orthopnea [Shortness Of Breath] : no shortness of breath [Cough] : no cough [Wheezing] : no wheezing [Dyspnea on Exertion] : not dyspnea on exertion [Abdominal Pain] : no abdominal pain [Melena] : no melena [Constipation] : no constipation [Frequency] : no frequency [Hesitancy] : no hesitancy [Incontinence] : no incontinence [Joint Stiffness] : no joint stiffness [Joint Pain] : no joint pain [Muscle Pain] : no muscle pain [Back Pain] : no back pain [Nail Changes] : no nail changes [Skin Rash] : no skin rash [Dizziness] : no dizziness [Fainting] : no fainting [Suicidal] : not suicidal [Insomnia] : no insomnia [Depression] : no depression

## 2019-12-03 NOTE — HISTORY OF PRESENT ILLNESS
[FreeTextEntry1] : Fever and temp 4 days 101  green sputum taking Abx feels bad allows blood and EKG will agree to CXR

## 2019-12-04 ENCOUNTER — RESULT CHARGE (OUTPATIENT)
Age: 69
End: 2019-12-04

## 2019-12-04 LAB
ALBUMIN SERPL ELPH-MCNC: 4.2 G/DL
ALP BLD-CCNC: 80 U/L
ALT SERPL-CCNC: 20 U/L
ANION GAP SERPL CALC-SCNC: 14 MMOL/L
AST SERPL-CCNC: 14 U/L
BASOPHILS # BLD AUTO: 0.08 K/UL
BASOPHILS NFR BLD AUTO: 0.8 %
BILIRUB SERPL-MCNC: 0.3 MG/DL
BUN SERPL-MCNC: 20 MG/DL
CALCIUM SERPL-MCNC: 10.1 MG/DL
CHLORIDE SERPL-SCNC: 101 MMOL/L
CO2 SERPL-SCNC: 27 MMOL/L
CREAT SERPL-MCNC: 0.97 MG/DL
EOSINOPHIL # BLD AUTO: 0.03 K/UL
EOSINOPHIL NFR BLD AUTO: 0.3 %
ERYTHROCYTE [SEDIMENTATION RATE] IN BLOOD BY WESTERGREN METHOD: 46 MM/HR
GLUCOSE SERPL-MCNC: 117 MG/DL
HCT VFR BLD CALC: 51.7 %
HGB BLD-MCNC: 16.4 G/DL
IMM GRANULOCYTES NFR BLD AUTO: 0.6 %
LYMPHOCYTES # BLD AUTO: 2.16 K/UL
LYMPHOCYTES NFR BLD AUTO: 20.3 %
MAN DIFF?: NORMAL
MCHC RBC-ENTMCNC: 30 PG
MCHC RBC-ENTMCNC: 31.7 GM/DL
MCV RBC AUTO: 94.5 FL
MONOCYTES # BLD AUTO: 0.49 K/UL
MONOCYTES NFR BLD AUTO: 4.6 %
NEUTROPHILS # BLD AUTO: 7.8 K/UL
NEUTROPHILS NFR BLD AUTO: 73.4 %
PLATELET # BLD AUTO: 367 K/UL
POTASSIUM SERPL-SCNC: 4.8 MMOL/L
PROT SERPL-MCNC: 7.3 G/DL
RBC # BLD: 5.47 M/UL
RBC # FLD: 12.9 %
SODIUM SERPL-SCNC: 142 MMOL/L
WBC # FLD AUTO: 10.62 K/UL

## 2019-12-05 ENCOUNTER — APPOINTMENT (OUTPATIENT)
Dept: FAMILY MEDICINE | Facility: CLINIC | Age: 69
End: 2019-12-05
Payer: MEDICARE

## 2019-12-05 VITALS
WEIGHT: 190 LBS | SYSTOLIC BLOOD PRESSURE: 128 MMHG | HEART RATE: 101 BPM | RESPIRATION RATE: 13 BRPM | DIASTOLIC BLOOD PRESSURE: 78 MMHG | OXYGEN SATURATION: 98 % | BODY MASS INDEX: 30.53 KG/M2 | HEIGHT: 66 IN

## 2019-12-05 PROCEDURE — 99214 OFFICE O/P EST MOD 30 MIN: CPT | Mod: 25

## 2019-12-05 PROCEDURE — 94010 BREATHING CAPACITY TEST: CPT

## 2019-12-05 NOTE — PLAN
[FreeTextEntry1] : Follow up of severe bronchitis had Fever and temp 4 days 101  green sputum taking Abx feels greatly improved after shot and ABX \par CXR   NAD \par \par Continue ABX\par \par Hydration rest and Fluids \par PMH Insomnia LETICIA Fatigue s/p brain tumor

## 2019-12-05 NOTE — HISTORY OF PRESENT ILLNESS
[FreeTextEntry1] : Follow up of severe bronchitis had Fever and temp 4 days 101  green sputum taking Abx feels greatly improved after shot and ABX

## 2019-12-05 NOTE — PHYSICAL EXAM
[Well Nourished] : well nourished [No Acute Distress] : no acute distress [Well-Appearing] : well-appearing [Well Developed] : well developed [PERRL] : pupils equal round and reactive to light [Normal Sclera/Conjunctiva] : normal sclera/conjunctiva [Normal Outer Ear/Nose] : the outer ears and nose were normal in appearance [EOMI] : extraocular movements intact [Normal Oropharynx] : the oropharynx was normal [No JVD] : no jugular venous distention [Supple] : supple [No Lymphadenopathy] : no lymphadenopathy [Thyroid Normal, No Nodules] : the thyroid was normal and there were no nodules present [No Respiratory Distress] : no respiratory distress  [Clear to Auscultation] : lungs were clear to auscultation bilaterally [No Accessory Muscle Use] : no accessory muscle use [Normal Rate] : normal rate  [Regular Rhythm] : with a regular rhythm [Normal S1, S2] : normal S1 and S2 [No Murmur] : no murmur heard [No Carotid Bruits] : no carotid bruits [No Abdominal Bruit] : a ~M bruit was not heard ~T in the abdomen [No Varicosities] : no varicosities [No Palpable Aorta] : no palpable aorta [No Edema] : there was no peripheral edema [Pedal Pulses Present] : the pedal pulses are present [No Extremity Clubbing/Cyanosis] : no extremity clubbing/cyanosis [Soft] : abdomen soft [Non Tender] : non-tender [Non-distended] : non-distended [No Masses] : no abdominal mass palpated [No HSM] : no HSM [Normal Bowel Sounds] : normal bowel sounds [Normal Posterior Cervical Nodes] : no posterior cervical lymphadenopathy [Normal Anterior Cervical Nodes] : no anterior cervical lymphadenopathy [No Spinal Tenderness] : no spinal tenderness [No CVA Tenderness] : no CVA  tenderness [No Joint Swelling] : no joint swelling [Grossly Normal Strength/Tone] : grossly normal strength/tone [No Rash] : no rash [Coordination Grossly Intact] : coordination grossly intact [No Focal Deficits] : no focal deficits [Normal Gait] : normal gait [Deep Tendon Reflexes (DTR)] : deep tendon reflexes were 2+ and symmetric [Normal Affect] : the affect was normal [Normal Insight/Judgement] : insight and judgment were intact

## 2019-12-05 NOTE — COUNSELING
[Fall prevention counseling provided] : Fall prevention counseling provided [Behavioral health counseling provided] : Behavioral health counseling provided [Good understanding] : Patient has a good understanding of lifestyle changes and steps needed to achieve self management goal [None] : None [Potential consequences of obesity discussed] : Potential consequences of obesity discussed [Benefits of weight loss discussed] : Benefits of weight loss discussed [Encouraged to maintain food diary] : Encouraged to maintain food diary [Encouraged to increase physical activity] : Encouraged to increase physical activity

## 2019-12-05 NOTE — REVIEW OF SYSTEMS
[Palpitations] : palpitations [Anxiety] : anxiety [Negative] : Heme/Lymph [Fever] : no fever [Chills] : no chills [Hot Flashes] : no hot flashes [Pain] : no pain [Redness] : no redness [Itching] : no itching [Chest Pain] : no chest pain [Claudication] : no  leg claudication [Lower Ext Edema] : no lower extremity edema [Orthopena] : no orthopnea [Wheezing] : no wheezing [Shortness Of Breath] : no shortness of breath [Cough] : no cough [Dyspnea on Exertion] : not dyspnea on exertion [Abdominal Pain] : no abdominal pain [Melena] : no melena [Constipation] : no constipation [Incontinence] : no incontinence [Hesitancy] : no hesitancy [Frequency] : no frequency [Joint Pain] : no joint pain [Joint Stiffness] : no joint stiffness [Muscle Pain] : no muscle pain [Back Pain] : no back pain [Nail Changes] : no nail changes [Skin Rash] : no skin rash [Dizziness] : no dizziness [Fainting] : no fainting [Suicidal] : not suicidal [Insomnia] : no insomnia [Depression] : no depression

## 2019-12-06 LAB — LEGIONELLA AB SER QL: NEGATIVE

## 2019-12-09 LAB
B PERT IGG SER-ACNC: 1.93 INDEX
B PERT IGM SER-ACNC: 1.1 INDEX

## 2020-01-09 ENCOUNTER — APPOINTMENT (OUTPATIENT)
Dept: FAMILY MEDICINE | Facility: CLINIC | Age: 70
End: 2020-01-09
Payer: MEDICARE

## 2020-01-09 PROCEDURE — 99214 OFFICE O/P EST MOD 30 MIN: CPT

## 2020-01-09 RX ORDER — AMOXICILLIN AND CLAVULANATE POTASSIUM 875; 125 MG/1; MG/1
875-125 TABLET, COATED ORAL
Qty: 28 | Refills: 0 | Status: COMPLETED | COMMUNITY
Start: 2019-12-03 | End: 2020-01-09

## 2020-01-09 NOTE — HISTORY OF PRESENT ILLNESS
[FreeTextEntry1] : F/U appt for recurrent cough/congestion x 1 week and review of PMH of BPH and LETICIA. Patient states that he was initially seen 3 weeks ago for similar symptoms and was prescribed antibiotics to treat URI symptoms.  He states that 1 week into the treatment, he misplaced his antibiotics container resulting in non-compliance. He says that he was not coughing and was not congested when he was on antibiotics.  [de-identified] : F/U appt for recurrent cough/congestion x 1 week and review of PMH of BPH and LETICIA. Patient states that he was initially seen 3 weeks ago for similar symptoms and was prescribed antibiotics to treat URI symptoms.  He states that 1 week into the treatment, he misplaced his antibiotics container resulting in non-compliance. He says that he was not coughing and was not congested when he was on antibiotics.

## 2020-01-09 NOTE — HEALTH RISK ASSESSMENT
[Yes] : Yes [Monthly or less (1 pt)] : Monthly or less (1 point) [1 or 2 (0 pts)] : 1 or 2 (0 points) [Never (0 pts)] : Never (0 points) [No] : In the past 12 months have you used drugs other than those required for medical reasons? No [No falls in past year] : Patient reported no falls in the past year [0] : 1) Little interest or pleasure doing things: Not at all (0) [] : No [Audit-CScore] : 1 [de-identified] : Average [DSO7Coepu] : 0 [de-identified] : Average

## 2020-01-09 NOTE — ASSESSMENT
[FreeTextEntry1] : F/U appt for recurrent cough/congestion x 1 week and review of PMH of BPH and LETICIA. Patient states that he was initially seen 3 weeks ago for similar symptoms and was prescribed antibiotics to treat URI symptoms.  He states that 1 week into the treatment, he misplaced his antibiotics container resulting in non-compliance. He says that he was not coughing and was not congested when he was on antibiotics. \par \par Cough and Congestion - Fluids and Humidification. Started on Doxycycline.\par LETICIA/BPH - Medications renewed. Continue to control with medications. \par \par F/U in 2 weeks if symptoms persists.

## 2020-01-09 NOTE — PHYSICAL EXAM
[No Acute Distress] : no acute distress [Well Nourished] : well nourished [Well-Appearing] : well-appearing [Well Developed] : well developed [Normal Sclera/Conjunctiva] : normal sclera/conjunctiva [PERRL] : pupils equal round and reactive to light [EOMI] : extraocular movements intact [Normal Outer Ear/Nose] : the outer ears and nose were normal in appearance [No JVD] : no jugular venous distention [Normal Oropharynx] : the oropharynx was normal [Supple] : supple [No Lymphadenopathy] : no lymphadenopathy [No Respiratory Distress] : no respiratory distress  [Thyroid Normal, No Nodules] : the thyroid was normal and there were no nodules present [No Accessory Muscle Use] : no accessory muscle use [Normal Rate] : normal rate  [Regular Rhythm] : with a regular rhythm [No Murmur] : no murmur heard [Normal S1, S2] : normal S1 and S2 [No Abdominal Bruit] : a ~M bruit was not heard ~T in the abdomen [No Carotid Bruits] : no carotid bruits [No Varicosities] : no varicosities [No Edema] : there was no peripheral edema [No Palpable Aorta] : no palpable aorta [Pedal Pulses Present] : the pedal pulses are present [No Extremity Clubbing/Cyanosis] : no extremity clubbing/cyanosis [Soft] : abdomen soft [Non-distended] : non-distended [Non Tender] : non-tender [No Masses] : no abdominal mass palpated [No HSM] : no HSM [Normal Bowel Sounds] : normal bowel sounds [Normal Posterior Cervical Nodes] : no posterior cervical lymphadenopathy [Normal Anterior Cervical Nodes] : no anterior cervical lymphadenopathy [No CVA Tenderness] : no CVA  tenderness [No Spinal Tenderness] : no spinal tenderness [No Joint Swelling] : no joint swelling [Grossly Normal Strength/Tone] : grossly normal strength/tone [Coordination Grossly Intact] : coordination grossly intact [No Rash] : no rash [No Focal Deficits] : no focal deficits [Normal Gait] : normal gait [Deep Tendon Reflexes (DTR)] : deep tendon reflexes were 2+ and symmetric [Normal Affect] : the affect was normal [Normal Insight/Judgement] : insight and judgment were intact [de-identified] : Congestion with coughing.

## 2020-01-09 NOTE — COUNSELING
[Adequate lighting] : Adequate lighting [Fall prevention counseling provided] : Fall prevention counseling provided [No throw rugs] : No throw rugs [Use proper foot wear] : Use proper foot wear [Behavioral health counseling provided] : Behavioral health counseling provided [Sleep ___ hours/day] : Sleep [unfilled] hours/day [Plan in advance] : Plan in advance [Engage in a relaxing activity] : Engage in a relaxing activity [AUDIT-C Screening administered and reviewed] : AUDIT-C Screening administered and reviewed [Benefits of weight loss discussed] : Benefits of weight loss discussed [Potential consequences of obesity discussed] : Potential consequences of obesity discussed [Structured Weight Management Program suggested:] : Structured weight management program suggested [Encouraged to increase physical activity] : Encouraged to increase physical activity [Decrease Portions] : decrease portions [Weigh Self Weekly] : weigh self weekly [None] : None [Good understanding] : Patient has a good understanding of lifestyle changes and steps needed to achieve self management goal [FreeTextEntry2] : Not smoking.

## 2020-01-14 ENCOUNTER — APPOINTMENT (OUTPATIENT)
Dept: FAMILY MEDICINE | Facility: CLINIC | Age: 70
End: 2020-01-14

## 2020-02-05 ENCOUNTER — APPOINTMENT (OUTPATIENT)
Dept: FAMILY MEDICINE | Facility: CLINIC | Age: 70
End: 2020-02-05
Payer: MEDICARE

## 2020-02-05 VITALS
TEMPERATURE: 98.7 F | HEIGHT: 66 IN | BODY MASS INDEX: 30.53 KG/M2 | HEART RATE: 64 BPM | SYSTOLIC BLOOD PRESSURE: 118 MMHG | OXYGEN SATURATION: 98 % | RESPIRATION RATE: 13 BRPM | WEIGHT: 190 LBS | DIASTOLIC BLOOD PRESSURE: 70 MMHG

## 2020-02-05 PROCEDURE — 99214 OFFICE O/P EST MOD 30 MIN: CPT

## 2020-02-05 NOTE — COUNSELING
[Fall prevention counseling provided] : Fall prevention counseling provided [No throw rugs] : No throw rugs [Adequate lighting] : Adequate lighting [Behavioral health counseling provided] : Behavioral health counseling provided [Use proper foot wear] : Use proper foot wear [Sleep ___ hours/day] : Sleep [unfilled] hours/day [Plan in advance] : Plan in advance [Engage in a relaxing activity] : Engage in a relaxing activity [AUDIT-C Screening administered and reviewed] : AUDIT-C Screening administered and reviewed [Potential consequences of obesity discussed] : Potential consequences of obesity discussed [Benefits of weight loss discussed] : Benefits of weight loss discussed [Structured Weight Management Program suggested:] : Structured weight management program suggested [Encouraged to increase physical activity] : Encouraged to increase physical activity [Weigh Self Weekly] : weigh self weekly [Decrease Portions] : decrease portions [None] : None [Good understanding] : Patient has a good understanding of lifestyle changes and steps needed to achieve self management goal [FreeTextEntry2] : Not smoking.

## 2020-02-05 NOTE — HEALTH RISK ASSESSMENT
[Yes] : Yes [Monthly or less (1 pt)] : Monthly or less (1 point) [Never (0 pts)] : Never (0 points) [1 or 2 (0 pts)] : 1 or 2 (0 points) [No falls in past year] : Patient reported no falls in the past year [No] : In the past 12 months have you used drugs other than those required for medical reasons? No [0] : 2) Feeling down, depressed, or hopeless: Not at all (0) [] : No [Audit-CScore] : 1 [de-identified] : Average [de-identified] : Average [LVO5Rburm] : 0

## 2020-02-05 NOTE — HISTORY OF PRESENT ILLNESS
[de-identified] : F/U appt for congestion which has not yet resolved and review of PMH of BPH and LETICIA. Patient was taking the antibiotics but stopped because he started bringing u a lot of phlegm and felt more congested. He started taking decongestant which helped him to feel better. He still has a post nasal drip which is bothersome to him today. Patient states his dry cough has resolved. He is still under a lot of stress, but nothing has changed.

## 2020-02-05 NOTE — PHYSICAL EXAM
[No Acute Distress] : no acute distress [Well Developed] : well developed [Well Nourished] : well nourished [Well-Appearing] : well-appearing [EOMI] : extraocular movements intact [PERRL] : pupils equal round and reactive to light [Normal Sclera/Conjunctiva] : normal sclera/conjunctiva [Normal Outer Ear/Nose] : the outer ears and nose were normal in appearance [No JVD] : no jugular venous distention [Normal Oropharynx] : the oropharynx was normal [Supple] : supple [No Lymphadenopathy] : no lymphadenopathy [Thyroid Normal, No Nodules] : the thyroid was normal and there were no nodules present [No Accessory Muscle Use] : no accessory muscle use [No Respiratory Distress] : no respiratory distress  [Normal S1, S2] : normal S1 and S2 [Normal Rate] : normal rate  [Regular Rhythm] : with a regular rhythm [No Murmur] : no murmur heard [No Carotid Bruits] : no carotid bruits [No Abdominal Bruit] : a ~M bruit was not heard ~T in the abdomen [No Varicosities] : no varicosities [No Edema] : there was no peripheral edema [Pedal Pulses Present] : the pedal pulses are present [No Palpable Aorta] : no palpable aorta [No Extremity Clubbing/Cyanosis] : no extremity clubbing/cyanosis [Soft] : abdomen soft [Non-distended] : non-distended [Non Tender] : non-tender [No HSM] : no HSM [No Masses] : no abdominal mass palpated [Normal Bowel Sounds] : normal bowel sounds [Normal Posterior Cervical Nodes] : no posterior cervical lymphadenopathy [Normal Anterior Cervical Nodes] : no anterior cervical lymphadenopathy [No CVA Tenderness] : no CVA  tenderness [No Spinal Tenderness] : no spinal tenderness [No Joint Swelling] : no joint swelling [Grossly Normal Strength/Tone] : grossly normal strength/tone [No Rash] : no rash [Coordination Grossly Intact] : coordination grossly intact [No Focal Deficits] : no focal deficits [Normal Gait] : normal gait [Deep Tendon Reflexes (DTR)] : deep tendon reflexes were 2+ and symmetric [Normal Affect] : the affect was normal [Normal Insight/Judgement] : insight and judgment were intact [de-identified] : Congestion with coughing.

## 2020-02-05 NOTE — ASSESSMENT
[FreeTextEntry1] : F/U appt for congestion which has not yet resolved and review of PMH of BPH and LETICIA. Patient was taking the antibiotics but stopped because he started bringing u a lot of phlegm and felt more congested. He started taking decongestant which helped him to feel better. He still has a post nasal drip which is bothersome to him today. Patient states his dry cough has resolved. He is still under a lot of stress, but nothing has changed.\par \par Post nasal drip - Fexofenadine and Benzonatate sent to pharmacy\par \par RTC in 2 weeks

## 2020-09-01 ENCOUNTER — APPOINTMENT (OUTPATIENT)
Dept: FAMILY MEDICINE | Facility: CLINIC | Age: 70
End: 2020-09-01
Payer: MEDICARE

## 2020-09-01 DIAGNOSIS — Z86.79 PERSONAL HISTORY OF OTHER DISEASES OF THE CIRCULATORY SYSTEM: ICD-10-CM

## 2020-09-01 DIAGNOSIS — J06.9 ACUTE UPPER RESPIRATORY INFECTION, UNSPECIFIED: ICD-10-CM

## 2020-09-01 DIAGNOSIS — F41.1 GENERALIZED ANXIETY DISORDER: ICD-10-CM

## 2020-09-01 DIAGNOSIS — R50.9 FEVER, UNSPECIFIED: ICD-10-CM

## 2020-09-01 DIAGNOSIS — H53.9 UNSPECIFIED VISUAL DISTURBANCE: ICD-10-CM

## 2020-09-01 DIAGNOSIS — Z87.09 PERSONAL HISTORY OF OTHER DISEASES OF THE RESPIRATORY SYSTEM: ICD-10-CM

## 2020-09-01 DIAGNOSIS — Z87.898 PERSONAL HISTORY OF OTHER SPECIFIED CONDITIONS: ICD-10-CM

## 2020-09-01 DIAGNOSIS — Z86.19 PERSONAL HISTORY OF OTHER INFECTIOUS AND PARASITIC DISEASES: ICD-10-CM

## 2020-09-01 PROCEDURE — 36415 COLL VENOUS BLD VENIPUNCTURE: CPT

## 2020-09-01 PROCEDURE — 99214 OFFICE O/P EST MOD 30 MIN: CPT | Mod: 25

## 2020-09-01 RX ORDER — BENZONATATE 200 MG/1
200 CAPSULE ORAL 3 TIMES DAILY
Qty: 30 | Refills: 0 | Status: DISCONTINUED | COMMUNITY
Start: 2020-02-05 | End: 2020-09-01

## 2020-09-01 RX ORDER — DOXYCYCLINE 100 MG/1
100 CAPSULE ORAL
Qty: 24 | Refills: 0 | Status: DISCONTINUED | COMMUNITY
Start: 2020-01-09 | End: 2020-09-01

## 2020-09-01 NOTE — PLAN
[FreeTextEntry1] : Patient wants a medication refill and hemorrhoid test. Patient denies pain or blood in stool. has issues with ED and needs meds \par \par Care plan reviewed. \par Labs drawn.\par \par Meds given\par

## 2020-09-01 NOTE — HISTORY OF PRESENT ILLNESS
[FreeTextEntry1] : Patient wants a medication refill and hemorrhoid test. Patient denies pain or blood in stool. has issues with ED and needs meds

## 2020-09-03 LAB
ALBUMIN SERPL ELPH-MCNC: 4.4 G/DL
ALP BLD-CCNC: 63 U/L
ALT SERPL-CCNC: 18 U/L
ANION GAP SERPL CALC-SCNC: 12 MMOL/L
APPEARANCE: CLEAR
AST SERPL-CCNC: 18 U/L
BASOPHILS # BLD AUTO: 0.06 K/UL
BASOPHILS NFR BLD AUTO: 0.6 %
BILIRUB SERPL-MCNC: 0.3 MG/DL
BILIRUBIN URINE: NEGATIVE
BLOOD URINE: NEGATIVE
BUN SERPL-MCNC: 22 MG/DL
CALCIUM SERPL-MCNC: 9.4 MG/DL
CHLORIDE SERPL-SCNC: 107 MMOL/L
CHOLEST SERPL-MCNC: 165 MG/DL
CHOLEST/HDLC SERPL: 2.6 RATIO
CO2 SERPL-SCNC: 22 MMOL/L
COLOR: NORMAL
CREAT SERPL-MCNC: 1.17 MG/DL
EOSINOPHIL # BLD AUTO: 0.13 K/UL
EOSINOPHIL NFR BLD AUTO: 1.3 %
ERYTHROCYTE [SEDIMENTATION RATE] IN BLOOD BY WESTERGREN METHOD: 20 MM/HR
FOLATE SERPL-MCNC: 16.2 NG/ML
GLUCOSE QUALITATIVE U: NEGATIVE
GLUCOSE SERPL-MCNC: 87 MG/DL
HCT VFR BLD CALC: 45 %
HCV AB SER QL: NONREACTIVE
HCV S/CO RATIO: 0.15 S/CO
HDLC SERPL-MCNC: 64 MG/DL
HGB BLD-MCNC: 14.5 G/DL
IMM GRANULOCYTES NFR BLD AUTO: 0.2 %
KETONES URINE: NEGATIVE
LDLC SERPL CALC-MCNC: 85 MG/DL
LEUKOCYTE ESTERASE URINE: NEGATIVE
LYMPHOCYTES # BLD AUTO: 2.43 K/UL
LYMPHOCYTES NFR BLD AUTO: 24.5 %
MAN DIFF?: NORMAL
MCHC RBC-ENTMCNC: 31 PG
MCHC RBC-ENTMCNC: 32.2 GM/DL
MCV RBC AUTO: 96.4 FL
MONOCYTES # BLD AUTO: 0.64 K/UL
MONOCYTES NFR BLD AUTO: 6.5 %
NEUTROPHILS # BLD AUTO: 6.62 K/UL
NEUTROPHILS NFR BLD AUTO: 66.9 %
NITRITE URINE: NEGATIVE
PH URINE: 5.5
PLATELET # BLD AUTO: 275 K/UL
POTASSIUM SERPL-SCNC: 4.9 MMOL/L
PROT SERPL-MCNC: 6.4 G/DL
PROTEIN URINE: NORMAL
PSA SERPL-MCNC: 0.95 NG/ML
RBC # BLD: 4.67 M/UL
RBC # FLD: 14 %
SODIUM SERPL-SCNC: 141 MMOL/L
SPECIFIC GRAVITY URINE: 1.03
TESTOST SERPL-MCNC: 217 NG/DL
TRIGL SERPL-MCNC: 81 MG/DL
TSH SERPL-ACNC: 1.51 UIU/ML
UROBILINOGEN URINE: NORMAL
VIT B12 SERPL-MCNC: 624 PG/ML
WBC # FLD AUTO: 9.9 K/UL

## 2020-09-11 LAB
SARS-COV-2 IGG SERPL IA-ACNC: 0.08 INDEX
SARS-COV-2 IGG SERPL QL IA: NEGATIVE

## 2020-10-09 LAB — HEMOCCULT STL QL IA: NEGATIVE

## 2020-11-28 NOTE — ASU PATIENT PROFILE, ADULT - PRO ARRIVE FROM
Med Rec completed per patient and wife   Allergies reviewed  No ORAL antibiotics in last 14 days         home

## 2021-03-04 ENCOUNTER — APPOINTMENT (OUTPATIENT)
Dept: FAMILY MEDICINE | Facility: CLINIC | Age: 71
End: 2021-03-04
Payer: MEDICARE

## 2021-03-04 ENCOUNTER — OUTPATIENT (OUTPATIENT)
Dept: OUTPATIENT SERVICES | Facility: HOSPITAL | Age: 71
LOS: 1 days | End: 2021-03-04
Payer: MEDICARE

## 2021-03-04 VITALS
SYSTOLIC BLOOD PRESSURE: 128 MMHG | BODY MASS INDEX: 30.28 KG/M2 | RESPIRATION RATE: 16 BRPM | WEIGHT: 188.4 LBS | TEMPERATURE: 97.8 F | HEIGHT: 66 IN | DIASTOLIC BLOOD PRESSURE: 90 MMHG | OXYGEN SATURATION: 99 % | HEART RATE: 68 BPM

## 2021-03-04 DIAGNOSIS — G51.9 DISORDER OF FACIAL NERVE, UNSPECIFIED: ICD-10-CM

## 2021-03-04 DIAGNOSIS — Z98.890 OTHER SPECIFIED POSTPROCEDURAL STATES: Chronic | ICD-10-CM

## 2021-03-04 DIAGNOSIS — Z01.818 ENCOUNTER FOR OTHER PREPROCEDURAL EXAMINATION: ICD-10-CM

## 2021-03-04 LAB
ABO RH CONFIRMATION: SIGNIFICANT CHANGE UP
ANION GAP SERPL CALC-SCNC: 5 MMOL/L — SIGNIFICANT CHANGE UP (ref 5–17)
BASOPHILS # BLD AUTO: 0.07 K/UL — SIGNIFICANT CHANGE UP (ref 0–0.2)
BASOPHILS NFR BLD AUTO: 0.8 % — SIGNIFICANT CHANGE UP (ref 0–2)
BUN SERPL-MCNC: 24 MG/DL — HIGH (ref 7–23)
CALCIUM SERPL-MCNC: 9 MG/DL — SIGNIFICANT CHANGE UP (ref 8.5–10.1)
CHLORIDE SERPL-SCNC: 110 MMOL/L — HIGH (ref 96–108)
CO2 SERPL-SCNC: 28 MMOL/L — SIGNIFICANT CHANGE UP (ref 22–31)
CREAT SERPL-MCNC: 1.09 MG/DL — SIGNIFICANT CHANGE UP (ref 0.5–1.3)
EOSINOPHIL # BLD AUTO: 0.09 K/UL — SIGNIFICANT CHANGE UP (ref 0–0.5)
EOSINOPHIL NFR BLD AUTO: 1 % — SIGNIFICANT CHANGE UP (ref 0–6)
GLUCOSE SERPL-MCNC: 97 MG/DL — SIGNIFICANT CHANGE UP (ref 70–99)
HCT VFR BLD CALC: 48.6 % — SIGNIFICANT CHANGE UP (ref 39–50)
HGB BLD-MCNC: 15.8 G/DL — SIGNIFICANT CHANGE UP (ref 13–17)
IMM GRANULOCYTES NFR BLD AUTO: 0.3 % — SIGNIFICANT CHANGE UP (ref 0–1.5)
LYMPHOCYTES # BLD AUTO: 3.36 K/UL — HIGH (ref 1–3.3)
LYMPHOCYTES # BLD AUTO: 36.4 % — SIGNIFICANT CHANGE UP (ref 13–44)
MCHC RBC-ENTMCNC: 30.6 PG — SIGNIFICANT CHANGE UP (ref 27–34)
MCHC RBC-ENTMCNC: 32.5 GM/DL — SIGNIFICANT CHANGE UP (ref 32–36)
MCV RBC AUTO: 94.2 FL — SIGNIFICANT CHANGE UP (ref 80–100)
MONOCYTES # BLD AUTO: 0.59 K/UL — SIGNIFICANT CHANGE UP (ref 0–0.9)
MONOCYTES NFR BLD AUTO: 6.4 % — SIGNIFICANT CHANGE UP (ref 2–14)
NEUTROPHILS # BLD AUTO: 5.09 K/UL — SIGNIFICANT CHANGE UP (ref 1.8–7.4)
NEUTROPHILS NFR BLD AUTO: 55.1 % — SIGNIFICANT CHANGE UP (ref 43–77)
PLATELET # BLD AUTO: 293 K/UL — SIGNIFICANT CHANGE UP (ref 150–400)
POTASSIUM SERPL-MCNC: 4.4 MMOL/L — SIGNIFICANT CHANGE UP (ref 3.5–5.3)
POTASSIUM SERPL-SCNC: 4.4 MMOL/L — SIGNIFICANT CHANGE UP (ref 3.5–5.3)
RBC # BLD: 5.16 M/UL — SIGNIFICANT CHANGE UP (ref 4.2–5.8)
RBC # FLD: 13 % — SIGNIFICANT CHANGE UP (ref 10.3–14.5)
SODIUM SERPL-SCNC: 143 MMOL/L — SIGNIFICANT CHANGE UP (ref 135–145)
WBC # BLD: 9.23 K/UL — SIGNIFICANT CHANGE UP (ref 3.8–10.5)
WBC # FLD AUTO: 9.23 K/UL — SIGNIFICANT CHANGE UP (ref 3.8–10.5)

## 2021-03-04 PROCEDURE — 36415 COLL VENOUS BLD VENIPUNCTURE: CPT

## 2021-03-04 PROCEDURE — 93005 ELECTROCARDIOGRAM TRACING: CPT

## 2021-03-04 PROCEDURE — 86850 RBC ANTIBODY SCREEN: CPT

## 2021-03-04 PROCEDURE — 80048 BASIC METABOLIC PNL TOTAL CA: CPT

## 2021-03-04 PROCEDURE — 86901 BLOOD TYPING SEROLOGIC RH(D): CPT

## 2021-03-04 PROCEDURE — 99215 OFFICE O/P EST HI 40 MIN: CPT

## 2021-03-04 PROCEDURE — 86900 BLOOD TYPING SEROLOGIC ABO: CPT

## 2021-03-04 PROCEDURE — 93010 ELECTROCARDIOGRAM REPORT: CPT

## 2021-03-04 PROCEDURE — 85025 COMPLETE CBC W/AUTO DIFF WBC: CPT

## 2021-03-04 RX ORDER — TADALAFIL 20 MG/1
20 TABLET ORAL
Qty: 6 | Refills: 6 | Status: DISCONTINUED | OUTPATIENT
Start: 2020-09-01 | End: 2021-03-04

## 2021-03-04 NOTE — CHART NOTE - NSCHARTNOTEFT_GEN_A_CORE
70 year old man presents to PST for preprocedure exam. Patient is for planned static suspension of right face with right lower eyelid ectropion repair with Dr Barrios on 3/12.     Vital Signs Last 24 Hrs     T(F): -- 98.3  HR: -- 70  BP: -- 140/79  BP(mean): --  RR: -- 18  SpO2: -- 99      Plan:  - PST instructions given ; NPO status instructions to be given by ASU .  - Pt instructed to take following meds with sip of water : no meds  - Pt instructed to take routine evening medications unless indicated .  -  Stop NSAIDS ( Aspirin Alev Motrin Mobic Diclofenac), herbal supplements , MVI , Vitamin fish oil 7 days prior to surgery  unless   directed by surgeon or cardiologist;   - Labs EKG as per surgeon request.   -  Pt instructed to self quarantine .  - Covid Testing scheduled. Pt notified and aware.  - Pt denies covid symptoms shortness of breath fever cough.

## 2021-03-05 DIAGNOSIS — G51.9 DISORDER OF FACIAL NERVE, UNSPECIFIED: ICD-10-CM

## 2021-03-05 DIAGNOSIS — Z01.818 ENCOUNTER FOR OTHER PREPROCEDURAL EXAMINATION: ICD-10-CM

## 2021-03-08 NOTE — ASSESSMENT
[Patient Optimized for Surgery] : Patient optimized for surgery [No Further Testing Recommended] : no further testing recommended [FreeTextEntry4] : Patient cleared  for procedure

## 2021-03-08 NOTE — HISTORY OF PRESENT ILLNESS
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

## 2021-03-09 ENCOUNTER — APPOINTMENT (OUTPATIENT)
Dept: DISASTER EMERGENCY | Facility: CLINIC | Age: 71
End: 2021-03-09

## 2021-03-09 ENCOUNTER — OUTPATIENT (OUTPATIENT)
Dept: OUTPATIENT SERVICES | Facility: HOSPITAL | Age: 71
LOS: 1 days | End: 2021-03-09
Payer: MEDICARE

## 2021-03-09 DIAGNOSIS — Z98.890 OTHER SPECIFIED POSTPROCEDURAL STATES: Chronic | ICD-10-CM

## 2021-03-09 DIAGNOSIS — Z20.828 CONTACT WITH AND (SUSPECTED) EXPOSURE TO OTHER VIRAL COMMUNICABLE DISEASES: ICD-10-CM

## 2021-03-09 LAB — SARS-COV-2 RNA SPEC QL NAA+PROBE: SIGNIFICANT CHANGE UP

## 2021-03-09 PROCEDURE — U0003: CPT

## 2021-03-09 PROCEDURE — U0005: CPT

## 2021-03-09 PROCEDURE — C9803: CPT

## 2021-03-10 DIAGNOSIS — Z20.828 CONTACT WITH AND (SUSPECTED) EXPOSURE TO OTHER VIRAL COMMUNICABLE DISEASES: ICD-10-CM

## 2021-03-12 ENCOUNTER — OUTPATIENT (OUTPATIENT)
Dept: INPATIENT UNIT | Facility: HOSPITAL | Age: 71
LOS: 1 days | Discharge: HOME | End: 2021-03-12
Payer: MEDICARE

## 2021-03-12 VITALS
WEIGHT: 188.94 LBS | OXYGEN SATURATION: 99 % | SYSTOLIC BLOOD PRESSURE: 145 MMHG | HEART RATE: 59 BPM | RESPIRATION RATE: 16 BRPM | HEIGHT: 66 IN | TEMPERATURE: 98 F | DIASTOLIC BLOOD PRESSURE: 85 MMHG

## 2021-03-12 DIAGNOSIS — Z98.890 OTHER SPECIFIED POSTPROCEDURAL STATES: Chronic | ICD-10-CM

## 2021-03-12 DIAGNOSIS — G51.9 DISORDER OF FACIAL NERVE, UNSPECIFIED: ICD-10-CM

## 2021-03-12 DIAGNOSIS — G51.0 BELL'S PALSY: ICD-10-CM

## 2021-03-12 PROCEDURE — 86769 SARS-COV-2 COVID-19 ANTIBODY: CPT

## 2021-03-12 PROCEDURE — 36415 COLL VENOUS BLD VENIPUNCTURE: CPT

## 2021-03-12 PROCEDURE — 86803 HEPATITIS C AB TEST: CPT

## 2021-03-12 PROCEDURE — C1889: CPT

## 2021-03-12 RX ORDER — ONDANSETRON 8 MG/1
4 TABLET, FILM COATED ORAL ONCE
Refills: 0 | Status: DISCONTINUED | OUTPATIENT
Start: 2021-03-12 | End: 2021-03-12

## 2021-03-12 RX ORDER — ONDANSETRON 8 MG/1
4 TABLET, FILM COATED ORAL ONCE
Refills: 0 | Status: COMPLETED | OUTPATIENT
Start: 2021-03-12 | End: 2021-03-13

## 2021-03-12 RX ORDER — BACITRACIN 500 [USP'U]/G
1 OINTMENT OPHTHALMIC
Qty: 0 | Refills: 0 | DISCHARGE
Start: 2021-03-12

## 2021-03-12 RX ORDER — SENNA PLUS 8.6 MG/1
2 TABLET ORAL AT BEDTIME
Refills: 0 | Status: DISCONTINUED | OUTPATIENT
Start: 2021-03-12 | End: 2021-03-13

## 2021-03-12 RX ORDER — SENNA PLUS 8.6 MG/1
2 TABLET ORAL
Qty: 0 | Refills: 0 | DISCHARGE
Start: 2021-03-12

## 2021-03-12 RX ORDER — MORPHINE SULFATE 50 MG/1
2 CAPSULE, EXTENDED RELEASE ORAL EVERY 4 HOURS
Refills: 0 | Status: DISCONTINUED | OUTPATIENT
Start: 2021-03-12 | End: 2021-03-13

## 2021-03-12 RX ORDER — ESOMEPRAZOLE MAGNESIUM 40 MG/1
1 CAPSULE, DELAYED RELEASE ORAL
Qty: 0 | Refills: 0 | DISCHARGE

## 2021-03-12 RX ORDER — NITROGLYCERIN 6.5 MG
3 CAPSULE, EXTENDED RELEASE ORAL
Refills: 0 | Status: DISCONTINUED | OUTPATIENT
Start: 2021-03-12 | End: 2021-03-13

## 2021-03-12 RX ORDER — FENTANYL CITRATE 50 UG/ML
50 INJECTION INTRAVENOUS
Refills: 0 | Status: DISCONTINUED | OUTPATIENT
Start: 2021-03-12 | End: 2021-03-12

## 2021-03-12 RX ORDER — SODIUM CHLORIDE 9 MG/ML
1000 INJECTION, SOLUTION INTRAVENOUS
Refills: 0 | Status: DISCONTINUED | OUTPATIENT
Start: 2021-03-12 | End: 2021-03-12

## 2021-03-12 RX ORDER — BACITRACIN ZINC 500 UNIT/G
1 OINTMENT IN PACKET (EA) TOPICAL
Qty: 0 | Refills: 0 | DISCHARGE
Start: 2021-03-12

## 2021-03-12 RX ORDER — BACITRACIN 500 [USP'U]/G
1 OINTMENT OPHTHALMIC THREE TIMES A DAY
Refills: 0 | Status: DISCONTINUED | OUTPATIENT
Start: 2021-03-12 | End: 2021-03-13

## 2021-03-12 RX ORDER — BACITRACIN ZINC 500 UNIT/G
1 OINTMENT IN PACKET (EA) TOPICAL THREE TIMES A DAY
Refills: 0 | Status: DISCONTINUED | OUTPATIENT
Start: 2021-03-12 | End: 2021-03-13

## 2021-03-12 RX ORDER — TETRAHYDROZOLINE/POLYETHYL GLY 0.05 %-1 %
1 DROPS OPHTHALMIC (EYE)
Qty: 0 | Refills: 0 | DISCHARGE

## 2021-03-12 RX ORDER — OXYCODONE HYDROCHLORIDE 5 MG/1
10 TABLET ORAL ONCE
Refills: 0 | Status: DISCONTINUED | OUTPATIENT
Start: 2021-03-12 | End: 2021-03-12

## 2021-03-12 RX ORDER — ACETAMINOPHEN 500 MG
1000 TABLET ORAL EVERY 6 HOURS
Refills: 0 | Status: DISCONTINUED | OUTPATIENT
Start: 2021-03-12 | End: 2021-03-13

## 2021-03-12 RX ORDER — ALPRAZOLAM 0.25 MG
1 TABLET ORAL
Qty: 0 | Refills: 0 | DISCHARGE

## 2021-03-12 RX ORDER — OXYCODONE HYDROCHLORIDE 5 MG/1
5 TABLET ORAL EVERY 4 HOURS
Refills: 0 | Status: DISCONTINUED | OUTPATIENT
Start: 2021-03-12 | End: 2021-03-13

## 2021-03-12 RX ADMIN — OXYCODONE HYDROCHLORIDE 5 MILLIGRAM(S): 5 TABLET ORAL at 23:35

## 2021-03-12 RX ADMIN — FENTANYL CITRATE 50 MICROGRAM(S): 50 INJECTION INTRAVENOUS at 17:26

## 2021-03-12 RX ADMIN — Medication 1 APPLICATION(S): at 22:32

## 2021-03-12 RX ADMIN — OXYCODONE HYDROCHLORIDE 10 MILLIGRAM(S): 5 TABLET ORAL at 18:50

## 2021-03-12 RX ADMIN — FENTANYL CITRATE 50 MICROGRAM(S): 50 INJECTION INTRAVENOUS at 17:42

## 2021-03-12 RX ADMIN — Medication 1000 MILLIGRAM(S): at 23:43

## 2021-03-12 RX ADMIN — Medication 1000 MILLIGRAM(S): at 17:18

## 2021-03-12 RX ADMIN — FENTANYL CITRATE 50 MICROGRAM(S): 50 INJECTION INTRAVENOUS at 18:53

## 2021-03-12 RX ADMIN — SODIUM CHLORIDE 75 MILLILITER(S): 9 INJECTION, SOLUTION INTRAVENOUS at 16:34

## 2021-03-12 RX ADMIN — Medication 1000 MILLIGRAM(S): at 23:44

## 2021-03-12 RX ADMIN — OXYCODONE HYDROCHLORIDE 10 MILLIGRAM(S): 5 TABLET ORAL at 19:15

## 2021-03-12 RX ADMIN — FENTANYL CITRATE 50 MICROGRAM(S): 50 INJECTION INTRAVENOUS at 18:00

## 2021-03-12 RX ADMIN — Medication 1000 MILLIGRAM(S): at 18:00

## 2021-03-12 RX ADMIN — BACITRACIN 1 APPLICATION(S): 500 OINTMENT OPHTHALMIC at 22:17

## 2021-03-12 RX ADMIN — FENTANYL CITRATE 50 MICROGRAM(S): 50 INJECTION INTRAVENOUS at 19:00

## 2021-03-12 RX ADMIN — Medication 3 INCH(S): at 22:17

## 2021-03-12 NOTE — BRIEF OPERATIVE NOTE - NSICDXBRIEFPREOP_GEN_ALL_CORE_FT
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

## 2021-03-12 NOTE — H&P ADULT - NSICDXPASTSURGICALHX_GEN_ALL_CORE_FT
PAST SURGICAL HISTORY:  History of cranioplasty     History of craniotomy     History of facial surgery     History of surgery Rhytidectomy Superficial Muscloaponeurotic system

## 2021-03-12 NOTE — ASU DISCHARGE PLAN (ADULT/PEDIATRIC) - CARE PROVIDER_API CALL
Daryl Barrios)  Surgery  [address] [place] [address]  Phone: [phone]  Fax: [phone]  Follow Up Time:

## 2021-03-12 NOTE — BRIEF OPERATIVE NOTE - NSICDXBRIEFPOSTOP_GEN_ALL_CORE_FT
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

## 2021-03-12 NOTE — H&P ADULT - NSICDXPASTMEDICALHX_GEN_ALL_CORE_FT
PAST MEDICAL HISTORY:  AN (acoustic neuroma)     BPH (benign prostatic hyperplasia)     Calculus of kidney Kidney stone    Facial nerve paralysis     LETICIA (generalized anxiety disorder)     GERD (gastroesophageal reflux disease)     Measles     Meningitis     Nocturia

## 2021-03-12 NOTE — BRIEF OPERATIVE NOTE - NSICDXBRIEFPROCEDURE_GEN_ALL_CORE_FT
PROCEDURES:  Rhytidectomy of right cheek 12-Mar-2021 16:20:08  Rafia Jackson   PROCEDURES:  Tarsal strip procedure 12-Mar-2021 16:28:30  Rafia Jackson  Rhytidectomy of right cheek 12-Mar-2021 16:20:08  Rafia Jackson

## 2021-03-12 NOTE — ASU DISCHARGE PLAN (ADULT/PEDIATRIC) - PROCEDURE
Static suspension of right face with fascia brandie cadaver graft and right lower eyelid tarsal strip canthoplasty and right rhytidectomy.

## 2021-03-12 NOTE — ASU DISCHARGE PLAN (ADULT/PEDIATRIC) - ASU DC SPECIAL INSTRUCTIONSFT
You should follow-up in the office in 1 week on Friday March 19th, please call for follow-up appointment if you have not already made one. [phone].   Prescription pain medications have been prescribed for you, take as needed for pain only. Do not drive a vehicle or operate machinery while taking narcotic pain medication.   Ambulating is very important post operatively, make sure you ambulate several times daily.   If you experience bleeding that does not stop, swelling, hardness of surgical site, chest pain, shortness of breath, leg pain, dizziness or any signs or symptoms of infection such as fever, redness, pus, foul smell of surgical site please contact the office immediately, [phone].

## 2021-03-12 NOTE — H&P ADULT - HISTORY OF PRESENT ILLNESS
Pt presents to the office with right sided facial paralysis and right lower lid ectropion. Pt is scheduled for surgery today.

## 2021-03-12 NOTE — H&P ADULT - NSHPPHYSICALEXAM_GEN_ALL_CORE
Neuro: A+O x4 MUSA  RESP: CTA A/P  CV: RRR S1 S2  HEENT: CN 2-12 intact except for CN 7 on right side. Right eyebrow ptosis, right lower lid ectropion. Left posterior ear: scar is hypertrophic.  ABD: Soft non tender +BS

## 2021-03-12 NOTE — BRIEF OPERATIVE NOTE - OPERATION/FINDINGS
Static suspension of right face with fascia brandie cadaver graft Static suspension of right face with fascia brandie cadaver graft and right lower eyelid tarsal strip canthoplasty and right rhytidectomy.

## 2021-03-13 ENCOUNTER — TRANSCRIPTION ENCOUNTER (OUTPATIENT)
Age: 71
End: 2021-03-13

## 2021-03-13 VITALS
HEART RATE: 89 BPM | TEMPERATURE: 97 F | RESPIRATION RATE: 16 BRPM | OXYGEN SATURATION: 96 % | SYSTOLIC BLOOD PRESSURE: 133 MMHG | DIASTOLIC BLOOD PRESSURE: 82 MMHG

## 2021-03-13 LAB
HCV AB S/CO SERPL IA: 0.08 S/CO — SIGNIFICANT CHANGE UP (ref 0–0.99)
HCV AB SERPL-IMP: SIGNIFICANT CHANGE UP
SARS-COV-2 IGG SERPL QL IA: NEGATIVE — SIGNIFICANT CHANGE UP
SARS-COV-2 IGM SERPL IA-ACNC: 0.07 INDEX — SIGNIFICANT CHANGE UP

## 2021-03-13 RX ADMIN — BACITRACIN 1 APPLICATION(S): 500 OINTMENT OPHTHALMIC at 05:25

## 2021-03-13 RX ADMIN — MORPHINE SULFATE 2 MILLIGRAM(S): 50 CAPSULE, EXTENDED RELEASE ORAL at 00:40

## 2021-03-13 RX ADMIN — MORPHINE SULFATE 2 MILLIGRAM(S): 50 CAPSULE, EXTENDED RELEASE ORAL at 00:22

## 2021-03-13 RX ADMIN — Medication 1000 MILLIGRAM(S): at 05:24

## 2021-03-13 RX ADMIN — OXYCODONE HYDROCHLORIDE 5 MILLIGRAM(S): 5 TABLET ORAL at 00:22

## 2021-03-13 RX ADMIN — MORPHINE SULFATE 2 MILLIGRAM(S): 50 CAPSULE, EXTENDED RELEASE ORAL at 04:35

## 2021-03-13 RX ADMIN — MORPHINE SULFATE 2 MILLIGRAM(S): 50 CAPSULE, EXTENDED RELEASE ORAL at 04:20

## 2021-03-13 RX ADMIN — ONDANSETRON 4 MILLIGRAM(S): 8 TABLET, FILM COATED ORAL at 00:22

## 2021-03-13 RX ADMIN — Medication 1 APPLICATION(S): at 05:24

## 2021-03-13 NOTE — PROGRESS NOTE ADULT - SUBJECTIVE AND OBJECTIVE BOX
POD 1 s/p Right Rhytidectomy with static suspension, right lower eyelid tarsal strip canthoplasty. Pt is doing well no issues over night, pain controlled. Patient states that this was the first morning in a long time that his eye was not dry and did not have to put eye drops. The patient also stated that after eating brekfast this was the first time in a very long time that food was not caught in his cheek and wasn't drooling. Patient is very happy with his appearance.      PE: Gen- NAD AAOX3    Focused- incisions c/d/i, mild edema to right face and carlos a-orbital edema, incision c/d/i, skin with good cap refill 2-3 secs. Drain in place, serosang drainage    MELISA 30 overnight

## 2021-03-13 NOTE — PROGRESS NOTE ADULT - ASSESSMENT
POD 1 s/p Right Rhytidectomy with static suspension, right lower eyelid tarsal strip canthoplasty.     -d/c Home   - drain teaching  -keep head elevated  -ice right eye  -f/u office next week

## 2021-03-13 NOTE — DISCHARGE NOTE NURSING/CASE MANAGEMENT/SOCIAL WORK - PATIENT PORTAL LINK FT
You can access the FollowMyHealth Patient Portal offered by [facility] by registering at the following website: [email or web address]. By joining [employer]’s FollowMyHealth portal, you will also be able to view your health information using other applications (apps) compatible with our system.

## 2021-03-19 DIAGNOSIS — N40.0 BENIGN PROSTATIC HYPERPLASIA WITHOUT LOWER URINARY TRACT SYMPTOMS: ICD-10-CM

## 2021-03-19 DIAGNOSIS — G51.0 BELL'S PALSY: ICD-10-CM

## 2021-03-19 DIAGNOSIS — K21.9 GASTRO-ESOPHAGEAL REFLUX DISEASE WITHOUT ESOPHAGITIS: ICD-10-CM

## 2021-03-19 DIAGNOSIS — Z88.1 ALLERGY STATUS TO OTHER ANTIBIOTIC AGENTS STATUS: ICD-10-CM

## 2021-03-19 DIAGNOSIS — K76.0 FATTY (CHANGE OF) LIVER, NOT ELSEWHERE CLASSIFIED: ICD-10-CM

## 2021-03-19 DIAGNOSIS — H02.102 UNSPECIFIED ECTROPION OF RIGHT LOWER EYELID: ICD-10-CM

## 2021-03-19 DIAGNOSIS — Z87.442 PERSONAL HISTORY OF URINARY CALCULI: ICD-10-CM

## 2021-08-04 NOTE — ASU PATIENT PROFILE, ADULT - AS SC BRADEN NUTRITION
reviewed.Small refill of pain meds sent to pharmacy.  Will discuss more at upcoming appt   (4) excellent

## 2021-09-14 ENCOUNTER — APPOINTMENT (OUTPATIENT)
Dept: FAMILY MEDICINE | Facility: CLINIC | Age: 71
End: 2021-09-14
Payer: MEDICARE

## 2021-09-14 ENCOUNTER — LABORATORY RESULT (OUTPATIENT)
Age: 71
End: 2021-09-14

## 2021-09-14 VITALS
SYSTOLIC BLOOD PRESSURE: 118 MMHG | HEIGHT: 66 IN | RESPIRATION RATE: 16 BRPM | DIASTOLIC BLOOD PRESSURE: 70 MMHG | TEMPERATURE: 98 F | WEIGHT: 189 LBS | HEART RATE: 76 BPM | BODY MASS INDEX: 30.37 KG/M2 | OXYGEN SATURATION: 98 %

## 2021-09-14 LAB — HBA1C MFR BLD HPLC: 5.8

## 2021-09-14 PROCEDURE — 83036 HEMOGLOBIN GLYCOSYLATED A1C: CPT | Mod: QW

## 2021-09-14 PROCEDURE — 99214 OFFICE O/P EST MOD 30 MIN: CPT | Mod: 25

## 2021-09-15 LAB
BASOPHILS # BLD AUTO: 0.06 K/UL
BASOPHILS NFR BLD AUTO: 0.7 %
EOSINOPHIL # BLD AUTO: 0.09 K/UL
EOSINOPHIL NFR BLD AUTO: 1 %
ERYTHROCYTE [SEDIMENTATION RATE] IN BLOOD BY WESTERGREN METHOD: 12 MM/HR
HCT VFR BLD CALC: 49.6 %
HGB BLD-MCNC: 15.2 G/DL
IMM GRANULOCYTES NFR BLD AUTO: 0.2 %
LYMPHOCYTES # BLD AUTO: 3.3 K/UL
LYMPHOCYTES NFR BLD AUTO: 36.3 %
MAN DIFF?: NORMAL
MCHC RBC-ENTMCNC: 29.7 PG
MCHC RBC-ENTMCNC: 30.6 GM/DL
MCV RBC AUTO: 97.1 FL
MONOCYTES # BLD AUTO: 0.54 K/UL
MONOCYTES NFR BLD AUTO: 5.9 %
NEUTROPHILS # BLD AUTO: 5.09 K/UL
NEUTROPHILS NFR BLD AUTO: 55.9 %
PLATELET # BLD AUTO: 302 K/UL
RBC # BLD: 5.11 M/UL
RBC # FLD: 13.7 %
WBC # FLD AUTO: 9.1 K/UL

## 2021-09-15 NOTE — PLAN
[FreeTextEntry1] : Patient in for follow up of anxiety, had 4 friends die in last few months, last ones  yesterday, has wife dying of alcoholism, and he provides support. 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

## 2021-09-15 NOTE — HEALTH RISK ASSESSMENT
[] : No [No] : In the past 12 months have you used drugs other than those required for medical reasons? No [No falls in past year] : Patient reported no falls in the past year [0] : 2) Feeling down, depressed, or hopeless: Not at all (0) [PHQ-2 Negative - No further assessment needed] : PHQ-2 Negative - No further assessment needed [[id number]] : 0 [Patient/Caregiver not ready to engage] : , patient/caregiver not ready to engage

## 2021-09-15 NOTE — HISTORY OF PRESENT ILLNESS
[FreeTextEntry1] : Patient in for follow up of anxiety, had 4 friends die in last few months, last ones  yesterday, has wife dying of alcoholism, and he provides support. 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

## 2021-09-16 LAB
ALBUMIN SERPL ELPH-MCNC: 4.2 G/DL
ALP BLD-CCNC: 93 U/L
ALT SERPL-CCNC: 14 U/L
ANION GAP SERPL CALC-SCNC: 18 MMOL/L
AST SERPL-CCNC: 17 U/L
B BURGDOR IGG+IGM SER QL IB: NORMAL
BILIRUB SERPL-MCNC: <0.2 MG/DL
BUN SERPL-MCNC: 23 MG/DL
CALCIUM SERPL-MCNC: 9.3 MG/DL
CHLORIDE SERPL-SCNC: 107 MMOL/L
CHOLEST SERPL-MCNC: 167 MG/DL
CO2 SERPL-SCNC: 19 MMOL/L
COVID-19 NUCLEOCAPSID  GAM ANTIBODY INTERPRETATION: NEGATIVE
CREAT SERPL-MCNC: 1.02 MG/DL
FOLATE SERPL-MCNC: 4.6 NG/ML
GLUCOSE SERPL-MCNC: 107 MG/DL
HDLC SERPL-MCNC: 61 MG/DL
LDLC SERPL CALC-MCNC: 83 MG/DL
NONHDLC SERPL-MCNC: 105 MG/DL
POTASSIUM SERPL-SCNC: 4.3 MMOL/L
PROT SERPL-MCNC: 6.9 G/DL
PSA SERPL-MCNC: 1.01 NG/ML
SARS-COV-2 AB SERPL QL IA: 0.08 INDEX
SODIUM SERPL-SCNC: 144 MMOL/L
TESTOST SERPL-MCNC: 239 NG/DL
TRIGL SERPL-MCNC: 114 MG/DL
TSH SERPL-ACNC: 1.72 UIU/ML
VIT B12 SERPL-MCNC: 1040 PG/ML

## 2021-09-20 LAB
COVID-19 SPIKE DOMAIN ANTIBODY INTERPRETATION: POSITIVE
SARS-COV-2 AB SERPL IA-ACNC: >250 U/ML

## 2022-01-26 ENCOUNTER — LABORATORY RESULT (OUTPATIENT)
Age: 72
End: 2022-01-26

## 2022-01-26 ENCOUNTER — APPOINTMENT (OUTPATIENT)
Dept: FAMILY MEDICINE | Facility: CLINIC | Age: 72
End: 2022-01-26
Payer: MEDICARE

## 2022-01-26 VITALS
TEMPERATURE: 97.9 F | HEIGHT: 66 IN | RESPIRATION RATE: 16 BRPM | SYSTOLIC BLOOD PRESSURE: 124 MMHG | WEIGHT: 193 LBS | HEART RATE: 82 BPM | BODY MASS INDEX: 31.02 KG/M2 | DIASTOLIC BLOOD PRESSURE: 80 MMHG | OXYGEN SATURATION: 98 %

## 2022-01-26 PROCEDURE — 99214 OFFICE O/P EST MOD 30 MIN: CPT | Mod: 25

## 2022-01-26 PROCEDURE — 36415 COLL VENOUS BLD VENIPUNCTURE: CPT

## 2022-01-26 NOTE — HEALTH RISK ASSESSMENT
[No] : In the past 12 months have you used drugs other than those required for medical reasons? No [No falls in past year] : Patient reported no falls in the past year [0] : 2) Feeling down, depressed, or hopeless: Not at all (0) [PHQ-2 Negative - No further assessment needed] : PHQ-2 Negative - No further assessment needed [Patient/Caregiver not ready to engage] : , patient/caregiver not ready to engage [[id number]] : 0

## 2022-01-26 NOTE — PLAN
[FreeTextEntry1] : Patient in for follow up of anxiety, had 4 friends die in last few months. has wife dying of alcoholism, and he provides support. Patient received facial reconstruction 6 months ago due to damage from acoustic neuroma surgery\par \par Patient part of support group \par meds renewed

## 2022-01-26 NOTE — HISTORY OF PRESENT ILLNESS
[FreeTextEntry1] : Patient in for follow up of anxiety -  had 4 friends die in last few months, has wife dying of alcoholism, and he provides support. Patient received facial reconstruction sx 6 months ago from damage from acoustic neuroma surgery\par needs meds renewed \par no c/o

## 2022-01-27 LAB
ALBUMIN SERPL ELPH-MCNC: 4.4 G/DL
ALP BLD-CCNC: 82 U/L
ALT SERPL-CCNC: 13 U/L
ANION GAP SERPL CALC-SCNC: 12 MMOL/L
APPEARANCE: ABNORMAL
AST SERPL-CCNC: 15 U/L
BASOPHILS # BLD AUTO: 0.04 K/UL
BASOPHILS NFR BLD AUTO: 0.5 %
BILIRUB SERPL-MCNC: 0.5 MG/DL
BILIRUBIN URINE: NEGATIVE
BLOOD URINE: NEGATIVE
BUN SERPL-MCNC: 24 MG/DL
CALCIUM SERPL-MCNC: 9.9 MG/DL
CHLORIDE SERPL-SCNC: 107 MMOL/L
CO2 SERPL-SCNC: 25 MMOL/L
COLOR: YELLOW
CREAT SERPL-MCNC: 1.05 MG/DL
EOSINOPHIL # BLD AUTO: 0.08 K/UL
EOSINOPHIL NFR BLD AUTO: 1 %
ERYTHROCYTE [SEDIMENTATION RATE] IN BLOOD BY WESTERGREN METHOD: 15 MM/HR
ESTIMATED AVERAGE GLUCOSE: 117 MG/DL
GLUCOSE QUALITATIVE U: NEGATIVE
GLUCOSE SERPL-MCNC: 76 MG/DL
HBA1C MFR BLD HPLC: 5.7 %
HCT VFR BLD CALC: 50.1 %
HGB BLD-MCNC: 15.7 G/DL
IMM GRANULOCYTES NFR BLD AUTO: 0.4 %
KETONES URINE: NEGATIVE
LEUKOCYTE ESTERASE URINE: NEGATIVE
LYMPHOCYTES # BLD AUTO: 2.91 K/UL
LYMPHOCYTES NFR BLD AUTO: 36.5 %
MAN DIFF?: NORMAL
MCHC RBC-ENTMCNC: 29.5 PG
MCHC RBC-ENTMCNC: 31.3 GM/DL
MCV RBC AUTO: 94.2 FL
MONOCYTES # BLD AUTO: 0.51 K/UL
MONOCYTES NFR BLD AUTO: 6.4 %
NEUTROPHILS # BLD AUTO: 4.41 K/UL
NEUTROPHILS NFR BLD AUTO: 55.2 %
NITRITE URINE: NEGATIVE
PH URINE: 5.5
PLATELET # BLD AUTO: 317 K/UL
POTASSIUM SERPL-SCNC: 4.6 MMOL/L
PROT SERPL-MCNC: 6.8 G/DL
PROTEIN URINE: NORMAL
RBC # BLD: 5.32 M/UL
RBC # FLD: 13.9 %
SODIUM SERPL-SCNC: 143 MMOL/L
SPECIFIC GRAVITY URINE: 1.03
TSH SERPL-ACNC: 1.66 UIU/ML
UROBILINOGEN URINE: NORMAL
WBC # FLD AUTO: 7.98 K/UL

## 2022-02-01 LAB
CHOLEST SERPL-MCNC: 168 MG/DL
HDLC SERPL-MCNC: 59 MG/DL
LDLC SERPL CALC-MCNC: 85 MG/DL
NONHDLC SERPL-MCNC: 109 MG/DL
TRIGL SERPL-MCNC: 119 MG/DL

## 2022-03-29 NOTE — ASU PATIENT PROFILE, ADULT - MEDICATION ADMINISTRATION INFO, PROFILE
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 Patient baseline O2 sat 75-85%. While in ED, patient comfortable with no signs of respiratory distress, O@ sats remains 77-83%, CXR shows clear lungs. Home pulse ox was compared with hospital O2 sats and was not accurate; it continued to show low 70's while pulse ox in hospital shows high 70s' low 80's. Mother was reassured. Mother was informed about signs and symptoms that require medical attention. Patient has an appointment with CT surgery on 4/1. She will follow up with her appointments with CT surgery and Dr. Quiñones as instructed.  
no concerns

## 2022-08-25 ENCOUNTER — APPOINTMENT (OUTPATIENT)
Dept: FAMILY MEDICINE | Facility: CLINIC | Age: 72
End: 2022-08-25

## 2022-08-25 VITALS
SYSTOLIC BLOOD PRESSURE: 128 MMHG | HEART RATE: 80 BPM | WEIGHT: 184 LBS | HEIGHT: 66 IN | TEMPERATURE: 97.2 F | BODY MASS INDEX: 29.57 KG/M2 | RESPIRATION RATE: 14 BRPM | OXYGEN SATURATION: 99 % | DIASTOLIC BLOOD PRESSURE: 78 MMHG

## 2022-08-25 PROCEDURE — 99214 OFFICE O/P EST MOD 30 MIN: CPT | Mod: 25

## 2022-08-25 PROCEDURE — 36415 COLL VENOUS BLD VENIPUNCTURE: CPT

## 2022-08-29 LAB
24R-OH-CALCIDIOL SERPL-MCNC: 50.3 PG/ML
ALBUMIN SERPL ELPH-MCNC: 4.5 G/DL
ALP BLD-CCNC: 85 U/L
ALT SERPL-CCNC: 17 U/L
ANION GAP SERPL CALC-SCNC: 13 MMOL/L
AST SERPL-CCNC: 20 U/L
BILIRUB SERPL-MCNC: 0.3 MG/DL
BUN SERPL-MCNC: 24 MG/DL
CALCIUM SERPL-MCNC: 9.3 MG/DL
CHLORIDE SERPL-SCNC: 108 MMOL/L
CHOLEST SERPL-MCNC: 164 MG/DL
CO2 SERPL-SCNC: 23 MMOL/L
COVID-19 NUCLEOCAPSID  GAM ANTIBODY INTERPRETATION: NEGATIVE
COVID-19 SPIKE DOMAIN ANTIBODY INTERPRETATION: POSITIVE
CREAT SERPL-MCNC: 1.09 MG/DL
EGFR: 72 ML/MIN/1.73M2
ESTIMATED AVERAGE GLUCOSE: 120 MG/DL
FOLATE SERPL-MCNC: 9.8 NG/ML
GLUCOSE SERPL-MCNC: 94 MG/DL
HBA1C MFR BLD HPLC: 5.8 %
HDLC SERPL-MCNC: 53 MG/DL
LDLC SERPL CALC-MCNC: 82 MG/DL
NONHDLC SERPL-MCNC: 111 MG/DL
POTASSIUM SERPL-SCNC: 4.4 MMOL/L
PROT SERPL-MCNC: 7 G/DL
PSA SERPL-MCNC: 1.02 NG/ML
SARS-COV-2 AB SERPL IA-ACNC: >250 U/ML
SARS-COV-2 AB SERPL QL IA: 0.84 INDEX
SODIUM SERPL-SCNC: 143 MMOL/L
TESTOST SERPL-MCNC: 237 NG/DL
TRIGL SERPL-MCNC: 143 MG/DL
TSH SERPL-ACNC: 1.63 UIU/ML
VIT B12 SERPL-MCNC: 724 PG/ML

## 2022-09-02 LAB
BASOPHILS # BLD AUTO: 0.04 K/UL
BASOPHILS NFR BLD AUTO: 0.4 %
EOSINOPHIL # BLD AUTO: 0.12 K/UL
EOSINOPHIL NFR BLD AUTO: 1.2 %
HCT VFR BLD CALC: 49.3 %
HGB BLD-MCNC: 15.3 G/DL
IMM GRANULOCYTES NFR BLD AUTO: 0.3 %
LYMPHOCYTES # BLD AUTO: 3.94 K/UL
LYMPHOCYTES NFR BLD AUTO: 38.6 %
MAN DIFF?: NORMAL
MCHC RBC-ENTMCNC: 29.5 PG
MCHC RBC-ENTMCNC: 31 GM/DL
MCV RBC AUTO: 95.2 FL
MONOCYTES # BLD AUTO: 0.61 K/UL
MONOCYTES NFR BLD AUTO: 6 %
NEUTROPHILS # BLD AUTO: 5.46 K/UL
NEUTROPHILS NFR BLD AUTO: 53.5 %
PLATELET # BLD AUTO: 278 K/UL
RBC # BLD: 5.18 M/UL
RBC # FLD: 13.8 %
WBC # FLD AUTO: 10.2 K/UL

## 2022-09-08 NOTE — HISTORY OF PRESENT ILLNESS
[FreeTextEntry8] : 2 weeks cellulitis left chest above left nipple thought was spider or ingrown hair had used pin stated pus came out

## 2022-09-08 NOTE — ASSESSMENT
[FreeTextEntry1] : 2 weeks cellulitis left chest above left nipple thought was spider or ingrown hair had used pin stated pus came out\par \par Care plan reviewed\par meds and Labs\par Compress\par PMH reviewed\par RTC 10 days

## 2022-09-13 ENCOUNTER — APPOINTMENT (OUTPATIENT)
Dept: FAMILY MEDICINE | Facility: CLINIC | Age: 72
End: 2022-09-13

## 2022-09-13 VITALS
TEMPERATURE: 97 F | WEIGHT: 185 LBS | DIASTOLIC BLOOD PRESSURE: 78 MMHG | RESPIRATION RATE: 14 BRPM | SYSTOLIC BLOOD PRESSURE: 120 MMHG | HEIGHT: 66 IN | HEART RATE: 67 BPM | BODY MASS INDEX: 29.73 KG/M2

## 2022-09-13 DIAGNOSIS — Z87.2 PERSONAL HISTORY OF DISEASES OF THE SKIN AND SUBCUTANEOUS TISSUE: ICD-10-CM

## 2022-09-13 DIAGNOSIS — I49.1 ATRIAL PREMATURE DEPOLARIZATION: ICD-10-CM

## 2022-09-13 DIAGNOSIS — Z87.898 PERSONAL HISTORY OF OTHER SPECIFIED CONDITIONS: ICD-10-CM

## 2022-09-13 DIAGNOSIS — Z01.818 ENCOUNTER FOR OTHER PREPROCEDURAL EXAMINATION: ICD-10-CM

## 2022-09-13 DIAGNOSIS — K64.4 RESIDUAL HEMORRHOIDAL SKIN TAGS: ICD-10-CM

## 2022-09-13 PROCEDURE — 99214 OFFICE O/P EST MOD 30 MIN: CPT

## 2022-09-13 NOTE — PLAN
[FreeTextEntry1] : 71 YOM with PMHx of anxiety, BPH, PAC, palpitations, sinus tachycardia and acoustic neuroma presents for F/U. Denies SOB, chest pain, fever, cough, N/V/D. Patient feels well overall. \par \par Local care of cyst\par Care plan reviewed\par Stress MGT reviewed\par RTC 3 month

## 2022-09-13 NOTE — COUNSELING
[Fall prevention counseling provided] : Fall prevention counseling provided [Adequate lighting] : Adequate lighting [No throw rugs] : No throw rugs [Behavioral health counseling provided] : Behavioral health counseling provided [Engage in a relaxing activity] : Engage in a relaxing activity [Potential consequences of obesity discussed] : Potential consequences of obesity discussed [Benefits of weight loss discussed] : Benefits of weight loss discussed [Encouraged to maintain food diary] : Encouraged to maintain food diary [Encouraged to increase physical activity] : Encouraged to increase physical activity [None] : None [Good understanding] : Patient has a good understanding of lifestyle changes and steps needed to achieve self management goal

## 2022-09-13 NOTE — HISTORY OF PRESENT ILLNESS
[FreeTextEntry1] : 71 YOM with PMHx of anxiety, BPH, PAC, palpitations, sinus tachycardia and acoustic neuroma presents for F/U. Denies SOB, chest pain, fever, cough, N/V/D. Patient feels well overall. chest cyst has resolved with Abx

## 2022-09-13 NOTE — ASSESSMENT
[FreeTextEntry1] : 71 YOM with PMHx of anxiety, BPH, PAC, palpitations, sinus tachycardia and acoustic neuroma presents for F/U.\par \par Care plan reviewed.\par Meds reviewed.\par RTC in 3 months.

## 2022-10-25 ENCOUNTER — NON-APPOINTMENT (OUTPATIENT)
Age: 72
End: 2022-10-25

## 2022-12-22 NOTE — ED PROVIDER NOTE - CROS ED RESP ALL NEG
"Pt reports headache and a \"different\" feeling to the L side of her face. She states her face feels warm, swollen, and numb.   " negative...

## 2023-03-22 ENCOUNTER — APPOINTMENT (OUTPATIENT)
Dept: FAMILY MEDICINE | Facility: CLINIC | Age: 73
End: 2023-03-22
Payer: MEDICARE

## 2023-03-22 ENCOUNTER — LABORATORY RESULT (OUTPATIENT)
Age: 73
End: 2023-03-22

## 2023-03-22 VITALS
RESPIRATION RATE: 14 BRPM | HEIGHT: 66 IN | BODY MASS INDEX: 29.41 KG/M2 | WEIGHT: 183 LBS | HEART RATE: 64 BPM | DIASTOLIC BLOOD PRESSURE: 80 MMHG | SYSTOLIC BLOOD PRESSURE: 140 MMHG | OXYGEN SATURATION: 98 %

## 2023-03-22 DIAGNOSIS — R35.1 NOCTURIA: ICD-10-CM

## 2023-03-22 PROCEDURE — 36415 COLL VENOUS BLD VENIPUNCTURE: CPT

## 2023-03-22 PROCEDURE — 96372 THER/PROPH/DIAG INJ SC/IM: CPT

## 2023-03-22 PROCEDURE — 99214 OFFICE O/P EST MOD 30 MIN: CPT | Mod: 25

## 2023-03-22 RX ORDER — AMOXICILLIN AND CLAVULANATE POTASSIUM 875; 125 MG/1; MG/1
875-125 TABLET, COATED ORAL
Qty: 14 | Refills: 0 | Status: DISCONTINUED | COMMUNITY
Start: 2022-08-25 | End: 2023-03-22

## 2023-03-22 NOTE — REVIEW OF SYSTEMS
[Itching] : itching [Mole Changes] : no mole changes [Nail Changes] : no nail changes [Hair Changes] : no hair changes [Negative] : Heme/Lymph

## 2023-03-22 NOTE — HISTORY OF PRESENT ILLNESS
[FreeTextEntry8] : Patient has Poison IVY on right hand wrist and abdominal area was picking golf balls in woods, has Itchy and blisters difuse rash, needs Xanax wife dying of cancer

## 2023-03-22 NOTE — PHYSICAL EXAM
[No Acute Distress] : no acute distress [Well Nourished] : well nourished [Well Developed] : well developed [Well-Appearing] : well-appearing [Normal Sclera/Conjunctiva] : normal sclera/conjunctiva [PERRL] : pupils equal round and reactive to light [EOMI] : extraocular movements intact [Normal Outer Ear/Nose] : the outer ears and nose were normal in appearance [Normal Oropharynx] : the oropharynx was normal [No JVD] : no jugular venous distention [No Lymphadenopathy] : no lymphadenopathy [Supple] : supple [Thyroid Normal, No Nodules] : the thyroid was normal and there were no nodules present [No Respiratory Distress] : no respiratory distress  [No Accessory Muscle Use] : no accessory muscle use [Clear to Auscultation] : lungs were clear to auscultation bilaterally [Normal Rate] : normal rate  [Regular Rhythm] : with a regular rhythm [Normal S1, S2] : normal S1 and S2 [No Murmur] : no murmur heard [No Carotid Bruits] : no carotid bruits [No Abdominal Bruit] : a ~M bruit was not heard ~T in the abdomen [No Varicosities] : no varicosities [Pedal Pulses Present] : the pedal pulses are present [No Edema] : there was no peripheral edema [No Palpable Aorta] : no palpable aorta [No Extremity Clubbing/Cyanosis] : no extremity clubbing/cyanosis [Soft] : abdomen soft [Non Tender] : non-tender [Non-distended] : non-distended [No Masses] : no abdominal mass palpated [No HSM] : no HSM [Normal Bowel Sounds] : normal bowel sounds [No CVA Tenderness] : no CVA  tenderness [No Spinal Tenderness] : no spinal tenderness [No Joint Swelling] : no joint swelling [Grossly Normal Strength/Tone] : grossly normal strength/tone [Coordination Grossly Intact] : coordination grossly intact [No Focal Deficits] : no focal deficits [Normal Gait] : normal gait [Deep Tendon Reflexes (DTR)] : deep tendon reflexes were 2+ and symmetric [Normal Affect] : the affect was normal [Normal Insight/Judgement] : insight and judgment were intact [de-identified] : Posion Ivy right hand and arm severe

## 2023-03-22 NOTE — PLAN
[FreeTextEntry1] : Patient has Poison IVY on right hand wrist and abdominal area was picking golf balls in woods, has Itchy and blisters difuse rash, needs Xanax wife dying of cancer \par \par Severe contact dermatitis\par Labs\par Steroid shot R glute\par Meds\par

## 2023-03-23 LAB
ALBUMIN SERPL ELPH-MCNC: 4.5 G/DL
ALP BLD-CCNC: 80 U/L
ALT SERPL-CCNC: 16 U/L
ANION GAP SERPL CALC-SCNC: 13 MMOL/L
AST SERPL-CCNC: 20 U/L
BASOPHILS # BLD AUTO: 0.07 K/UL
BASOPHILS NFR BLD AUTO: 0.8 %
BILIRUB SERPL-MCNC: 0.7 MG/DL
BUN SERPL-MCNC: 22 MG/DL
CALCIUM SERPL-MCNC: 10.1 MG/DL
CHLORIDE SERPL-SCNC: 103 MMOL/L
CHOLEST SERPL-MCNC: 169 MG/DL
CO2 SERPL-SCNC: 25 MMOL/L
COVID-19 NUCLEOCAPSID  GAM ANTIBODY INTERPRETATION: NEGATIVE
CREAT SERPL-MCNC: 1.16 MG/DL
EGFR: 67 ML/MIN/1.73M2
EOSINOPHIL # BLD AUTO: 0.06 K/UL
EOSINOPHIL NFR BLD AUTO: 0.6 %
ESTIMATED AVERAGE GLUCOSE: 120 MG/DL
FOLATE SERPL-MCNC: 13.1 NG/ML
GLUCOSE SERPL-MCNC: 117 MG/DL
HBA1C MFR BLD HPLC: 5.8 %
HCT VFR BLD CALC: 54.8 %
HDLC SERPL-MCNC: 61 MG/DL
HGB BLD-MCNC: 17.3 G/DL
IMM GRANULOCYTES NFR BLD AUTO: 0.1 %
LDLC SERPL CALC-MCNC: 95 MG/DL
LYMPHOCYTES # BLD AUTO: 2.96 K/UL
LYMPHOCYTES NFR BLD AUTO: 31.9 %
MAN DIFF?: NORMAL
MCHC RBC-ENTMCNC: 30.2 PG
MCHC RBC-ENTMCNC: 31.6 GM/DL
MCV RBC AUTO: 95.6 FL
MONOCYTES # BLD AUTO: 0.84 K/UL
MONOCYTES NFR BLD AUTO: 9.1 %
NEUTROPHILS # BLD AUTO: 5.33 K/UL
NEUTROPHILS NFR BLD AUTO: 57.5 %
NONHDLC SERPL-MCNC: 108 MG/DL
PLATELET # BLD AUTO: 278 K/UL
POTASSIUM SERPL-SCNC: 4.6 MMOL/L
PROT SERPL-MCNC: 7.3 G/DL
RBC # BLD: 5.73 M/UL
RBC # FLD: 13.2 %
SARS-COV-2 AB SERPL QL IA: 0.93 INDEX
SODIUM SERPL-SCNC: 141 MMOL/L
TRIGL SERPL-MCNC: 63 MG/DL
TSH SERPL-ACNC: 2.83 UIU/ML
URATE SERPL-MCNC: 4.9 MG/DL
VIT B12 SERPL-MCNC: 1027 PG/ML
WBC # FLD AUTO: 9.27 K/UL

## 2023-03-27 LAB
24R-OH-CALCIDIOL SERPL-MCNC: 83.2 PG/ML
PSA SERPL-MCNC: 1 NG/ML

## 2023-04-05 ENCOUNTER — APPOINTMENT (OUTPATIENT)
Dept: FAMILY MEDICINE | Facility: CLINIC | Age: 73
End: 2023-04-05
Payer: MEDICARE

## 2023-04-05 VITALS
SYSTOLIC BLOOD PRESSURE: 136 MMHG | BODY MASS INDEX: 30.37 KG/M2 | OXYGEN SATURATION: 98 % | HEART RATE: 56 BPM | HEIGHT: 66 IN | RESPIRATION RATE: 14 BRPM | DIASTOLIC BLOOD PRESSURE: 82 MMHG | WEIGHT: 189 LBS

## 2023-04-05 PROCEDURE — 99214 OFFICE O/P EST MOD 30 MIN: CPT

## 2023-04-11 NOTE — PLAN
[FreeTextEntry1] : Patient in for poison IVY mostly resolve with some secondary cellulitis \par \par Care plan reviewed\par Meds\par Local wound care\par RTC 2 week

## 2023-04-11 NOTE — PHYSICAL EXAM
[No Acute Distress] : no acute distress [Well Nourished] : well nourished [Well Developed] : well developed [Well-Appearing] : well-appearing [Normal Sclera/Conjunctiva] : normal sclera/conjunctiva [PERRL] : pupils equal round and reactive to light [EOMI] : extraocular movements intact [Normal Outer Ear/Nose] : the outer ears and nose were normal in appearance [Normal Oropharynx] : the oropharynx was normal [No JVD] : no jugular venous distention [No Lymphadenopathy] : no lymphadenopathy [Supple] : supple [Thyroid Normal, No Nodules] : the thyroid was normal and there were no nodules present [No Respiratory Distress] : no respiratory distress  [No Accessory Muscle Use] : no accessory muscle use [Clear to Auscultation] : lungs were clear to auscultation bilaterally [Normal Rate] : normal rate  [Regular Rhythm] : with a regular rhythm [Normal S1, S2] : normal S1 and S2 [No Murmur] : no murmur heard [No Carotid Bruits] : no carotid bruits [No Abdominal Bruit] : a ~M bruit was not heard ~T in the abdomen [No Varicosities] : no varicosities [Pedal Pulses Present] : the pedal pulses are present [No Edema] : there was no peripheral edema [No Palpable Aorta] : no palpable aorta [No Extremity Clubbing/Cyanosis] : no extremity clubbing/cyanosis [Soft] : abdomen soft [Non Tender] : non-tender [Non-distended] : non-distended [No Masses] : no abdominal mass palpated [No HSM] : no HSM [Normal Bowel Sounds] : normal bowel sounds [No CVA Tenderness] : no CVA  tenderness [No Spinal Tenderness] : no spinal tenderness [No Joint Swelling] : no joint swelling [Grossly Normal Strength/Tone] : grossly normal strength/tone [Coordination Grossly Intact] : coordination grossly intact [No Focal Deficits] : no focal deficits [Normal Gait] : normal gait [Deep Tendon Reflexes (DTR)] : deep tendon reflexes were 2+ and symmetric [Normal Affect] : the affect was normal [Normal Insight/Judgement] : insight and judgment were intact [de-identified] : Posion Ivy right hand and arm severe

## 2023-04-20 ENCOUNTER — APPOINTMENT (OUTPATIENT)
Dept: FAMILY MEDICINE | Facility: CLINIC | Age: 73
End: 2023-04-20
Payer: MEDICARE

## 2023-04-20 VITALS
WEIGHT: 182 LBS | RESPIRATION RATE: 14 BRPM | DIASTOLIC BLOOD PRESSURE: 76 MMHG | SYSTOLIC BLOOD PRESSURE: 130 MMHG | TEMPERATURE: 97.8 F | BODY MASS INDEX: 29.25 KG/M2 | HEART RATE: 100 BPM | OXYGEN SATURATION: 98 % | HEIGHT: 66 IN

## 2023-04-20 PROCEDURE — 99214 OFFICE O/P EST MOD 30 MIN: CPT

## 2023-04-20 NOTE — ASSESSMENT
[FreeTextEntry1] : Pt was given advise and counseling in Poison Ivy:\par -The best way to prevent poison ivy dermatitis is to identify and avoid the plants that cause it. These plants can irritate the skin year round, even during the winter months, and they can still cause a reaction after dying.\par \par ?Wear protective clothing, including long sleeves and pants, when working in areas where toxic plants may be found. Keep in mind that the resin and oils from the toxic plants can be carried on clothing, pets, and under fingernails.\par \par \par ?Wear heavy-duty vinyl gloves when doing yard work or gardening. The oils from toxic plants can seep through latex or rubber gloves.\par \par \par ?After coming in contact with poison ivy, remove any contaminated clothing. As soon as possible (minutes count, but you can try up to two hours later), wash under very warm or hot running water using dishwashing liquid on a damp washcloth. Wash your entire body three times, while always wiping in one direction and not back and forth. This seems to reduce irritation and help remove the oils. If you do not have rapid access to dishwashing liquid, try to use plain water and wipe your skin in the same fashion; you will at least get rid of some of the resin. Comparison of dishwashing liquid with more expensive products made for removing poison ivy oils did not show a difference in effectiveness.\par \par \par ?Creams and ointments that create a barrier between the skin and the urushiol oil may be somewhat effective for people who are frequently exposed to poison ivy.\par \par \par ?Avoid burning poisonous vegetation, which can disperse the plant particles in the smoke, irritate the skin, and cause poison ivy dermatitis.\par

## 2023-04-20 NOTE — HISTORY OF PRESENT ILLNESS
[FreeTextEntry1] : Poison Ivy F/up [de-identified] : Exposed to Poison Ivy while picking up golf balls, seen in the office on 3/22/23> Rx Allegra and medrol pack.\par With symptoms persistent seen in the office on 4/5/23 with Dx Cellulitis> strat Augmentin.\par Today presents with persistent pruritus in upper extremties> states has been using Calamine lotion.

## 2023-04-20 NOTE — PHYSICAL EXAM
[Normal] : no acute distress, well nourished, well developed and well-appearing [de-identified] : macular spread rash in upper extremties

## 2023-05-09 ENCOUNTER — TRANSCRIPTION ENCOUNTER (OUTPATIENT)
Age: 73
End: 2023-05-09

## 2023-05-09 ENCOUNTER — NON-APPOINTMENT (OUTPATIENT)
Age: 73
End: 2023-05-09

## 2023-05-09 RX ORDER — METHYLPREDNISOLONE 4 MG/1
4 TABLET ORAL
Qty: 1 | Refills: 0 | Status: COMPLETED | COMMUNITY
Start: 2023-03-22 | End: 2023-05-09

## 2023-05-09 RX ORDER — AMOXICILLIN AND CLAVULANATE POTASSIUM 875; 125 MG/1; MG/1
875-125 TABLET, COATED ORAL
Qty: 14 | Refills: 0 | Status: COMPLETED | COMMUNITY
Start: 2023-04-05 | End: 2023-05-09

## 2023-05-18 ENCOUNTER — APPOINTMENT (OUTPATIENT)
Dept: DERMATOLOGY | Facility: CLINIC | Age: 73
End: 2023-05-18
Payer: MEDICARE

## 2023-05-18 DIAGNOSIS — R20.8 OTHER DISTURBANCES OF SKIN SENSATION: ICD-10-CM

## 2023-05-18 PROCEDURE — 99204 OFFICE O/P NEW MOD 45 MIN: CPT

## 2023-05-18 NOTE — PHYSICAL EXAM
[Alert] : alert [Oriented x 3] : ~L oriented x 3 [Well Nourished] : well nourished [FreeTextEntry3] : Type II skin\par \par Dorsum right forearm: Moderate small splotchy erythematous patches\par Right hand: Mild ill-defined scaling present on the 3 and 4 volar fingers\par Right volar forearm is unaffected\par Left forearm and hand are unaffected

## 2023-05-18 NOTE — CONSULT LETTER
[Dear  ___] : Dear  [unfilled], [Consult Letter:] : I had the pleasure of evaluating your patient, [unfilled]. [Consult Closing:] : Thank you very much for allowing me to participate in the care of this patient.  If you have any questions, please do not hesitate to contact me. 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 gabapentin for the dysesthesia.\par \par Please see attached chart note for further details. [FreeTextEntry3] : Bret Kline MD\par [address], [address]\[place] [address]\par Tel ([phone])\par Fax ([phone])\par Private line ([phone])

## 2023-05-25 ENCOUNTER — NON-APPOINTMENT (OUTPATIENT)
Age: 73
End: 2023-05-25

## 2023-05-31 ENCOUNTER — APPOINTMENT (OUTPATIENT)
Dept: FAMILY MEDICINE | Facility: CLINIC | Age: 73
End: 2023-05-31
Payer: MEDICARE

## 2023-05-31 ENCOUNTER — NON-APPOINTMENT (OUTPATIENT)
Age: 73
End: 2023-05-31

## 2023-05-31 VITALS
DIASTOLIC BLOOD PRESSURE: 74 MMHG | WEIGHT: 175 LBS | SYSTOLIC BLOOD PRESSURE: 120 MMHG | TEMPERATURE: 98.1 F | HEIGHT: 66 IN | RESPIRATION RATE: 14 BRPM | BODY MASS INDEX: 28.12 KG/M2 | HEART RATE: 72 BPM | OXYGEN SATURATION: 96 %

## 2023-05-31 DIAGNOSIS — Z82.49 FAMILY HISTORY OF ISCHEMIC HEART DISEASE AND OTHER DISEASES OF THE CIRCULATORY SYSTEM: ICD-10-CM

## 2023-05-31 DIAGNOSIS — Z84.1 FAMILY HISTORY OF DISORDERS OF KIDNEY AND URETER: ICD-10-CM

## 2023-05-31 DIAGNOSIS — Z78.9 OTHER SPECIFIED HEALTH STATUS: ICD-10-CM

## 2023-05-31 DIAGNOSIS — Z83.3 FAMILY HISTORY OF DIABETES MELLITUS: ICD-10-CM

## 2023-05-31 DIAGNOSIS — R53.83 OTHER FATIGUE: ICD-10-CM

## 2023-05-31 PROCEDURE — G0438: CPT

## 2023-05-31 PROCEDURE — 93000 ELECTROCARDIOGRAM COMPLETE: CPT

## 2023-05-31 RX ORDER — FEXOFENADINE HYDROCHLORIDE 180 MG/1
180 TABLET ORAL
Qty: 30 | Refills: 1 | Status: DISCONTINUED | COMMUNITY
Start: 2020-02-05 | End: 2023-05-31

## 2023-05-31 RX ORDER — TAMSULOSIN HYDROCHLORIDE 0.4 MG/1
0.4 CAPSULE ORAL
Qty: 90 | Refills: 1 | Status: DISCONTINUED | COMMUNITY
Start: 2022-09-13 | End: 2023-05-31

## 2023-05-31 RX ORDER — ESOMEPRAZOLE MAGNESIUM 20 MG/1
TABLET ORAL
Refills: 0 | Status: DISCONTINUED | COMMUNITY
End: 2023-05-31

## 2023-05-31 RX ORDER — SILDENAFIL 100 MG/1
100 TABLET, FILM COATED ORAL
Qty: 18 | Refills: 4 | Status: DISCONTINUED | COMMUNITY
Start: 2021-03-04 | End: 2023-05-31

## 2023-05-31 RX ORDER — DIPHENHYDRAMINE HCL 25 MG/1
25 CAPSULE ORAL
Qty: 30 | Refills: 0 | Status: DISCONTINUED | COMMUNITY
Start: 2023-04-20 | End: 2023-05-31

## 2023-05-31 RX ORDER — FLUOCINONIDE 0.5 MG/G
0.05 GEL TOPICAL 4 TIMES DAILY
Qty: 1 | Refills: 5 | Status: DISCONTINUED | COMMUNITY
Start: 2023-03-24 | End: 2023-05-31

## 2023-05-31 RX ORDER — HYDROCORTISONE 25 MG/G
2.5 CREAM TOPICAL
Qty: 1 | Refills: 3 | Status: DISCONTINUED | COMMUNITY
Start: 2020-09-01 | End: 2023-05-31

## 2023-05-31 RX ORDER — TRIAMCINOLONE ACETONIDE 1 MG/G
0.1 CREAM TOPICAL
Qty: 1 | Refills: 2 | Status: DISCONTINUED | COMMUNITY
Start: 2023-05-18 | End: 2023-05-31

## 2023-05-31 RX ORDER — TRAMADOL HYDROCHLORIDE 50 MG/1
50 TABLET, COATED ORAL 3 TIMES DAILY
Qty: 21 | Refills: 0 | Status: DISCONTINUED | COMMUNITY
Start: 2023-04-05 | End: 2023-05-31

## 2023-05-31 NOTE — COUNSELING
[Fall prevention counseling provided] : Fall prevention counseling provided [Adequate lighting] : Adequate lighting [No throw rugs] : No throw rugs [Use proper foot wear] : Use proper foot wear [Behavioral health counseling provided] : Behavioral health counseling provided [Sleep ___ hours/day] : Sleep [unfilled] hours/day [Engage in a relaxing activity] : Engage in a relaxing activity [No] : Risk of tobacco use and health benefits of smoking cessation discussed: No [AUDIT-C Screening administered and reviewed] : AUDIT-C Screening administered and reviewed [Benefits of weight loss discussed] : Benefits of weight loss discussed [Encouraged to maintain food diary] : Encouraged to maintain food diary [Encouraged to increase physical activity] : Encouraged to increase physical activity [Decrease Portions] : decrease portions [None] : None [Good understanding] : Patient has a good understanding of lifestyle changes and steps needed to achieve self management goal

## 2023-05-31 NOTE — HEALTH RISK ASSESSMENT
[Good] : ~his/her~  mood as  good [Intercurrent ED visits] : went to ED [Intercurrent hospitalizations] : was admitted to the hospital  [No] : In the past 12 months have you used drugs other than those required for medical reasons? No [No falls in past year] : Patient reported no falls in the past year [0] : 2) Feeling down, depressed, or hopeless: Not at all (0) [PHQ-2 Negative - No further assessment needed] : PHQ-2 Negative - No further assessment needed [Patient declined Low Dose CT Scan] : Patient declined Low Dose CT Scan [Patient declined Retinal Exam] : Patient declined Retinal Exam. [Patient declined mammogram] : Patient declined mammogram [Patient declined PAP Smear] : Patient declined PAP Smear [Patient declined bone density test] : Patient declined bone density test [Patient declined colonoscopy] : Patient declined colonoscopy [None] : None [With Significant Other] : lives with significant other [Significant Other] : lives with significant other [# Of Children ___] : has [unfilled] children [Sexually Active] : sexually active [Feels Safe at Home] : Feels safe at home [Fully functional (bathing, dressing, toileting, transferring, walking, feeding)] : Fully functional (bathing, dressing, toileting, transferring, walking, feeding) [Fully functional (using the telephone, shopping, preparing meals, housekeeping, doing laundry, using] : Fully functional and needs no help or supervision to perform IADLs (using the telephone, shopping, preparing meals, housekeeping, doing laundry, using transportation, managing medications and managing finances) [Reports normal functional visual acuity (ie: able to read med bottle)] : Reports normal functional visual acuity [Smoke Detector] : smoke detector [Carbon Monoxide Detector] : carbon monoxide detector [Safety elements used in home] : safety elements used in home [Seat Belt] :  uses seat belt [Sunscreen] : uses sunscreen [Patient/Caregiver not ready to engage] : , patient/caregiver not ready to engage [Never] : Never [Audit-C[facility]] : 0 [facility] [[id number]] : 0 [LowDoseCTScan] : 5/23 [EyeExamDate] : 5/23 [Change in mental status noted] : No change in mental status noted [Language] : denies difficulty with language [Behavior] : denies difficulty with behavior [Learning/Retaining New Information] : denies difficulty learning/retaining new information [Handling Complex Tasks] : denies difficulty handling complex tasks [Reasoning] : denies difficulty with reasoning [Spatial Ability and Orientation] : denies difficulty with spatial ability and orientation [High Risk Behavior] : no high risk behavior [Reports changes in hearing] : Reports no changes in hearing [Reports changes in vision] : Reports no changes in vision [Reports changes in dental health] : Reports no changes in dental health [Guns at Home] : no guns at home [Travel to Developing Areas] : does not  travel to developing areas [TB Exposure] : is not being exposed to tuberculosis [Caregiver Concerns] : does not have caregiver concerns [MammogramDate] : 5/23 [PapSmearDate] : 5/23 [BoneDensityDate] : 5/23 [ColonoscopyDate] : 5/23 [HIVDate] : 9/2020 [HepatitisCDate] : 9/2020 [AdvancecareDate] : 5/23

## 2023-05-31 NOTE — HEALTH RISK ASSESSMENT
[Good] : ~his/her~  mood as  good [Intercurrent ED visits] : went to ED [Intercurrent hospitalizations] : was admitted to the hospital  [No] : In the past 12 months have you used drugs other than those required for medical reasons? No [No falls in past year] : Patient reported no falls in the past year [0] : 2) Feeling down, depressed, or hopeless: Not at all (0) [PHQ-2 Negative - No further assessment needed] : PHQ-2 Negative - No further assessment needed [Patient declined Low Dose CT Scan] : Patient declined Low Dose CT Scan [Patient declined Retinal Exam] : Patient declined Retinal Exam. [Patient declined mammogram] : Patient declined mammogram [Patient declined PAP Smear] : Patient declined PAP Smear [Patient declined bone density test] : Patient declined bone density test [Patient declined colonoscopy] : Patient declined colonoscopy [None] : None [With Significant Other] : lives with significant other [Significant Other] : lives with significant other [# Of Children ___] : has [unfilled] children [Sexually Active] : sexually active [Feels Safe at Home] : Feels safe at home [Fully functional (bathing, dressing, toileting, transferring, walking, feeding)] : Fully functional (bathing, dressing, toileting, transferring, walking, feeding) [Fully functional (using the telephone, shopping, preparing meals, housekeeping, doing laundry, using] : Fully functional and needs no help or supervision to perform IADLs (using the telephone, shopping, preparing meals, housekeeping, doing laundry, using transportation, managing medications and managing finances) [Reports normal functional visual acuity (ie: able to read med bottle)] : Reports normal functional visual acuity [Smoke Detector] : smoke detector [Carbon Monoxide Detector] : carbon monoxide detector [Safety elements used in home] : safety elements used in home [Seat Belt] :  uses seat belt [Sunscreen] : uses sunscreen [Patient/Caregiver not ready to engage] : , patient/caregiver not ready to engage [Never] : Never [Audit-CScore] : 0 [[facility]] : [facility] [IEC2Xzybz] : 0 [LowDoseCTScan] : 5/23 [EyeExamDate] : 5/23 [Change in mental status noted] : No change in mental status noted [Language] : denies difficulty with language [Behavior] : denies difficulty with behavior [Learning/Retaining New Information] : denies difficulty learning/retaining new information [Handling Complex Tasks] : denies difficulty handling complex tasks [Reasoning] : denies difficulty with reasoning [Spatial Ability and Orientation] : denies difficulty with spatial ability and orientation [High Risk Behavior] : no high risk behavior [Reports changes in hearing] : Reports no changes in hearing [Reports changes in vision] : Reports no changes in vision [Reports changes in dental health] : Reports no changes in dental health [Guns at Home] : no guns at home [Travel to Developing Areas] : does not  travel to developing areas [TB Exposure] : is not being exposed to tuberculosis [Caregiver Concerns] : does not have caregiver concerns [MammogramDate] : 5/23 [PapSmearDate] : 5/23 [BoneDensityDate] : 5/23 [ColonoscopyDate] : 5/23 [HIVDate] : 9/2020 [HepatitisCDate] : 9/2020 [AdvancecareDate] : 5/23

## 2023-05-31 NOTE — HEALTH RISK ASSESSMENT
[Good] : ~his/her~  mood as  good [Intercurrent ED visits] : went to ED [Intercurrent hospitalizations] : was admitted to the hospital  [No] : In the past 12 months have you used drugs other than those required for medical reasons? No [No falls in past year] : Patient reported no falls in the past year [0] : 2) Feeling down, depressed, or hopeless: Not at all (0) [PHQ-2 Negative - No further assessment needed] : PHQ-2 Negative - No further assessment needed [Patient declined Low Dose CT Scan] : Patient declined Low Dose CT Scan [Patient declined Retinal Exam] : Patient declined Retinal Exam. [Patient declined mammogram] : Patient declined mammogram [Patient declined PAP Smear] : Patient declined PAP Smear [Patient declined bone density test] : Patient declined bone density test [Patient declined colonoscopy] : Patient declined colonoscopy [None] : None [With Significant Other] : lives with significant other [Significant Other] : lives with significant other [# Of Children ___] : has [unfilled] children [Sexually Active] : sexually active [Feels Safe at Home] : Feels safe at home [Fully functional (bathing, dressing, toileting, transferring, walking, feeding)] : Fully functional (bathing, dressing, toileting, transferring, walking, feeding) [Fully functional (using the telephone, shopping, preparing meals, housekeeping, doing laundry, using] : Fully functional and needs no help or supervision to perform IADLs (using the telephone, shopping, preparing meals, housekeeping, doing laundry, using transportation, managing medications and managing finances) [Reports normal functional visual acuity (ie: able to read med bottle)] : Reports normal functional visual acuity [Smoke Detector] : smoke detector [Carbon Monoxide Detector] : carbon monoxide detector [Safety elements used in home] : safety elements used in home [Seat Belt] :  uses seat belt [Sunscreen] : uses sunscreen [Patient/Caregiver not ready to engage] : , patient/caregiver not ready to engage [Never] : Never [Audit-CScore] : 0 [[facility]] : 8 [[id number]] : 0 [LowDoseCTScan] : 5/23 [EyeExamDate] : 5/23 [Change in mental status noted] : No change in mental status noted [Language] : denies difficulty with language [Behavior] : denies difficulty with behavior [Learning/Retaining New Information] : denies difficulty learning/retaining new information [Handling Complex Tasks] : denies difficulty handling complex tasks [Reasoning] : denies difficulty with reasoning [Spatial Ability and Orientation] : denies difficulty with spatial ability and orientation [High Risk Behavior] : no high risk behavior [Reports changes in hearing] : Reports no changes in hearing [Reports changes in vision] : Reports no changes in vision [Reports changes in dental health] : Reports no changes in dental health [Guns at Home] : no guns at home [Travel to Developing Areas] : does not  travel to developing areas [TB Exposure] : is not being exposed to tuberculosis [Caregiver Concerns] : does not have caregiver concerns [MammogramDate] : 5/23 [PapSmearDate] : 5/23 [BoneDensityDate] : 5/23 [ColonoscopyDate] : 5/23 [HIVDate] : 9/2020 [HepatitisCDate] : 9/2020 [AdvancecareDate] : 5/23

## 2023-06-05 NOTE — REVIEW OF SYSTEMS
[Palpitations] : palpitations [Anxiety] : anxiety [Negative] : Heme/Lymph [Fever] : no fever [Chills] : no chills [Hot Flashes] : no hot flashes [Pain] : no pain [Redness] : no redness [Itching] : no itching [Chest Pain] : no chest pain [Claudication] : no  leg claudication [Lower Ext Edema] : no lower extremity edema [Orthopena] : no orthopnea [Shortness Of Breath] : no shortness of breath [Wheezing] : no wheezing [Cough] : no cough [Dyspnea on Exertion] : not dyspnea on exertion [Abdominal Pain] : no abdominal pain [Constipation] : no constipation [Melena] : no melena [Incontinence] : no incontinence [Hesitancy] : no hesitancy [Frequency] : no frequency [Joint Pain] : no joint pain [Joint Stiffness] : no joint stiffness [Muscle Pain] : no muscle pain [Back Pain] : no back pain [Skin Rash] : no skin rash [Nail Changes] : no nail changes [Dizziness] : no dizziness [Fainting] : no fainting [Suicidal] : not suicidal [Insomnia] : no insomnia [Depression] : no depression [de-identified] : facial paralysis

## 2023-06-05 NOTE — ASSESSMENT
[FreeTextEntry1] : FU 71 yo M pt w pmho facial N paralysis, acoustic neuroma, post- herpetic neuralgia, anxiety, and BPH here for FU post-stroke. Pt to FU w neurologist tomorrow.

## 2023-06-05 NOTE — REVIEW OF SYSTEMS
[Palpitations] : palpitations [Anxiety] : anxiety [Negative] : Heme/Lymph [Fever] : no fever [Chills] : no chills [Hot Flashes] : no hot flashes [Pain] : no pain [Redness] : no redness [Itching] : no itching [Chest Pain] : no chest pain [Claudication] : no  leg claudication [Lower Ext Edema] : no lower extremity edema [Orthopena] : no orthopnea [Shortness Of Breath] : no shortness of breath [Wheezing] : no wheezing [Cough] : no cough [Dyspnea on Exertion] : not dyspnea on exertion [Abdominal Pain] : no abdominal pain [Constipation] : no constipation [Melena] : no melena [Incontinence] : no incontinence [Hesitancy] : no hesitancy [Frequency] : no frequency [Joint Pain] : no joint pain [Joint Stiffness] : no joint stiffness [Muscle Pain] : no muscle pain [Back Pain] : no back pain [Skin Rash] : no skin rash [Nail Changes] : no nail changes [Dizziness] : no dizziness [Fainting] : no fainting [Suicidal] : not suicidal [Insomnia] : no insomnia [Depression] : no depression [de-identified] : facial paralysis

## 2023-06-05 NOTE — REVIEW OF SYSTEMS
[Palpitations] : palpitations [Anxiety] : anxiety [Negative] : Heme/Lymph [Fever] : no fever [Chills] : no chills [Hot Flashes] : no hot flashes [Pain] : no pain [Redness] : no redness [Itching] : no itching [Chest Pain] : no chest pain [Claudication] : no  leg claudication [Lower Ext Edema] : no lower extremity edema [Orthopena] : no orthopnea [Shortness Of Breath] : no shortness of breath [Wheezing] : no wheezing [Cough] : no cough [Dyspnea on Exertion] : not dyspnea on exertion [Abdominal Pain] : no abdominal pain [Constipation] : no constipation [Melena] : no melena [Incontinence] : no incontinence [Hesitancy] : no hesitancy [Frequency] : no frequency [Joint Pain] : no joint pain [Joint Stiffness] : no joint stiffness [Muscle Pain] : no muscle pain [Back Pain] : no back pain [Skin Rash] : no skin rash [Nail Changes] : no nail changes [Dizziness] : no dizziness [Fainting] : no fainting [Suicidal] : not suicidal [Insomnia] : no insomnia [Depression] : no depression [de-identified] : facial paralysis

## 2023-06-05 NOTE — PHYSICAL EXAM
[Well Nourished] : well nourished [Well Developed] : well developed [Well-Appearing] : well-appearing [Normal Sclera/Conjunctiva] : normal sclera/conjunctiva [PERRL] : pupils equal round and reactive to light [EOMI] : extraocular movements intact [Normal Outer Ear/Nose] : the outer ears and nose were normal in appearance [Normal Oropharynx] : the oropharynx was normal [No JVD] : no jugular venous distention [No Lymphadenopathy] : no lymphadenopathy [Supple] : supple [Thyroid Normal, No Nodules] : the thyroid was normal and there were no nodules present [No Accessory Muscle Use] : no accessory muscle use [No Respiratory Distress] : no respiratory distress  [Clear to Auscultation] : lungs were clear to auscultation bilaterally [Normal Rate] : normal rate  [Regular Rhythm] : with a regular rhythm [No Murmur] : no murmur heard [Normal S1, S2] : normal S1 and S2 [No Carotid Bruits] : no carotid bruits [No Abdominal Bruit] : a ~M bruit was not heard ~T in the abdomen [No Varicosities] : no varicosities [Pedal Pulses Present] : the pedal pulses are present [No Edema] : there was no peripheral edema [No Palpable Aorta] : no palpable aorta [No Extremity Clubbing/Cyanosis] : no extremity clubbing/cyanosis [Soft] : abdomen soft [Non Tender] : non-tender [Non-distended] : non-distended [No Masses] : no abdominal mass palpated [No HSM] : no HSM [Normal Bowel Sounds] : normal bowel sounds [No CVA Tenderness] : no CVA  tenderness [No Spinal Tenderness] : no spinal tenderness [No Joint Swelling] : no joint swelling [Grossly Normal Strength/Tone] : grossly normal strength/tone [No Rash] : no rash [Coordination Grossly Intact] : coordination grossly intact [No Focal Deficits] : no focal deficits [Normal Gait] : normal gait [Deep Tendon Reflexes (DTR)] : deep tendon reflexes were 2+ and symmetric [Normal Affect] : the affect was normal [Normal Insight/Judgement] : insight and judgment were intact [de-identified] : facial nerve paralysis

## 2023-06-05 NOTE — ASSESSMENT
[FreeTextEntry1] : FU 73 yo M pt w pmho facial N paralysis, acoustic neuroma, post- herpetic neuralgia, anxiety, and BPH here for FU post-stroke. Pt to FU w neurologist tomorrow.

## 2023-06-05 NOTE — HISTORY OF PRESENT ILLNESS
[FreeTextEntry1] : FU post-stroke [de-identified] : FU 71 yo M pt w pmho facial N paralysis, acoustic neuroma, post- herpetic neuralgia, anxiety, and BPH here for FU post-stroke

## 2023-06-05 NOTE — PHYSICAL EXAM
[Well Nourished] : well nourished [Well Developed] : well developed [Well-Appearing] : well-appearing [Normal Sclera/Conjunctiva] : normal sclera/conjunctiva [PERRL] : pupils equal round and reactive to light [EOMI] : extraocular movements intact [Normal Outer Ear/Nose] : the outer ears and nose were normal in appearance [Normal Oropharynx] : the oropharynx was normal [No JVD] : no jugular venous distention [No Lymphadenopathy] : no lymphadenopathy [Supple] : supple [Thyroid Normal, No Nodules] : the thyroid was normal and there were no nodules present [No Accessory Muscle Use] : no accessory muscle use [No Respiratory Distress] : no respiratory distress  [Clear to Auscultation] : lungs were clear to auscultation bilaterally [Normal Rate] : normal rate  [Regular Rhythm] : with a regular rhythm [Normal S1, S2] : normal S1 and S2 [No Murmur] : no murmur heard [No Carotid Bruits] : no carotid bruits [No Abdominal Bruit] : a ~M bruit was not heard ~T in the abdomen [No Varicosities] : no varicosities [Pedal Pulses Present] : the pedal pulses are present [No Edema] : there was no peripheral edema [No Palpable Aorta] : no palpable aorta [No Extremity Clubbing/Cyanosis] : no extremity clubbing/cyanosis [Soft] : abdomen soft [Non Tender] : non-tender [Non-distended] : non-distended [No Masses] : no abdominal mass palpated [No HSM] : no HSM [Normal Bowel Sounds] : normal bowel sounds [No CVA Tenderness] : no CVA  tenderness [No Spinal Tenderness] : no spinal tenderness [No Joint Swelling] : no joint swelling [Grossly Normal Strength/Tone] : grossly normal strength/tone [No Rash] : no rash [Coordination Grossly Intact] : coordination grossly intact [No Focal Deficits] : no focal deficits [Normal Gait] : normal gait [Deep Tendon Reflexes (DTR)] : deep tendon reflexes were 2+ and symmetric [Normal Affect] : the affect was normal [Normal Insight/Judgement] : insight and judgment were intact [de-identified] : facial nerve paralysis

## 2023-06-05 NOTE — HISTORY OF PRESENT ILLNESS
[FreeTextEntry1] : FU post-stroke [de-identified] : FU 73 yo M pt w pmho facial N paralysis, acoustic neuroma, post- herpetic neuralgia, anxiety, and BPH here for FU post-stroke

## 2023-06-05 NOTE — PHYSICAL EXAM
[Well Nourished] : well nourished [Well Developed] : well developed [Well-Appearing] : well-appearing [Normal Sclera/Conjunctiva] : normal sclera/conjunctiva [PERRL] : pupils equal round and reactive to light [EOMI] : extraocular movements intact [Normal Outer Ear/Nose] : the outer ears and nose were normal in appearance [Normal Oropharynx] : the oropharynx was normal [No JVD] : no jugular venous distention [No Lymphadenopathy] : no lymphadenopathy [Supple] : supple [Thyroid Normal, No Nodules] : the thyroid was normal and there were no nodules present [No Respiratory Distress] : no respiratory distress  [No Accessory Muscle Use] : no accessory muscle use [Clear to Auscultation] : lungs were clear to auscultation bilaterally [Normal Rate] : normal rate  [Regular Rhythm] : with a regular rhythm [Normal S1, S2] : normal S1 and S2 [No Murmur] : no murmur heard [No Carotid Bruits] : no carotid bruits [No Abdominal Bruit] : a ~M bruit was not heard ~T in the abdomen [No Varicosities] : no varicosities [Pedal Pulses Present] : the pedal pulses are present [No Edema] : there was no peripheral edema [No Palpable Aorta] : no palpable aorta [No Extremity Clubbing/Cyanosis] : no extremity clubbing/cyanosis [Soft] : abdomen soft [Non Tender] : non-tender [Non-distended] : non-distended [No Masses] : no abdominal mass palpated [No HSM] : no HSM [Normal Bowel Sounds] : normal bowel sounds [No CVA Tenderness] : no CVA  tenderness [No Spinal Tenderness] : no spinal tenderness [No Joint Swelling] : no joint swelling [Grossly Normal Strength/Tone] : grossly normal strength/tone [No Rash] : no rash [Coordination Grossly Intact] : coordination grossly intact [No Focal Deficits] : no focal deficits [Normal Gait] : normal gait [Deep Tendon Reflexes (DTR)] : deep tendon reflexes were 2+ and symmetric [Normal Affect] : the affect was normal [Normal Insight/Judgement] : insight and judgment were intact [de-identified] : facial nerve paralysis

## 2023-07-05 ENCOUNTER — NON-APPOINTMENT (OUTPATIENT)
Age: 73
End: 2023-07-05

## 2023-07-05 ENCOUNTER — APPOINTMENT (OUTPATIENT)
Dept: CARDIOLOGY | Facility: CLINIC | Age: 73
End: 2023-07-05
Payer: MEDICARE

## 2023-07-05 VITALS
OXYGEN SATURATION: 97 % | HEIGHT: 66 IN | WEIGHT: 184 LBS | DIASTOLIC BLOOD PRESSURE: 72 MMHG | HEART RATE: 102 BPM | BODY MASS INDEX: 29.57 KG/M2 | SYSTOLIC BLOOD PRESSURE: 130 MMHG

## 2023-07-05 PROCEDURE — 99205 OFFICE O/P NEW HI 60 MIN: CPT

## 2023-07-05 PROCEDURE — 93000 ELECTROCARDIOGRAM COMPLETE: CPT

## 2023-07-05 NOTE — CARDIOLOGY SUMMARY
[de-identified] : 7/5/2023, Sinus Tachycardia with first degree AV Block, IVCD [de-identified] : May 2023 ([facility]), LV EF 45% and bubble study demonstrated no evidence of PFO.

## 2023-07-05 NOTE — DISCUSSION/SUMMARY
[FreeTextEntry1] : 1. Chronic Systolic HF/Abnormal ECG: patient euvolemic and asymptomatic. LV EF 45%, reported on [facility] echocardiogram report. I am recommending repeating this TTE in confirm these findings. Might be transient secondary to ischemic stroke. I explained to him that he should be on Entresto 24/26mg BID, Toprol XL 25mg daily, spironolactone 25mg daily, and Jardiance 10mg daily. If his LV EF is in fact depressed, he might need an invasive angiography to define the coronary artery anatomy. \par \par 2. Hx of ischemic CVAs: occurred May 2023. Appears cardioembolic. Especially given the depressed LV EF and his age, I am highly suspicious for asymptomatic PAF episodes. I am recommending he continue off Aspirin and Plavix. I am prescribing Eliquis 5mg BID. Recommend atorvastatin 40mg daily with goal LDL les than 70.  I explained to him the benefits of ILR. He agrees to proceed with procedure. \par \par 3. HLD: recommend starting atorvastatin 40mg daily with goal LDL less than 70.\par \par Follow up in 1 month [EKG obtained to assist in diagnosis and management of assessed problem(s)] : EKG obtained to assist in diagnosis and management of assessed problem(s)

## 2023-07-05 NOTE — PHYSICAL EXAM
[Normal] : moves all extremities, no focal deficits, normal speech [de-identified] : No carotid bruits auscultated bilaterally

## 2023-07-05 NOTE — HISTORY OF PRESENT ILLNESS
[FreeTextEntry1] : 72 year old male with PMHx of acoustic schwannoma s/p resection in 2007, Sandhu's Palsy, presents after hospitalization at [facility], May 2023 for multiple, bilateral ischemic strokes. Patient states he went to hospital because he was profoundly dizzy. He was discharged with the diagnosis of vertigo. Returned the next day because of profound dizziness, but developed aphasia. MRI of the brain demonstrated bilateral cerebellar (R>L), left posterior temporal lobe and peritrigonal region ischemic infarcts. Patient underwent TTE, which reported an LV EF 45% with diffuse hypokinesis and a bubble study demonstrated no evidence of PFO. Patient was discharged on Entresto, Spironolactone, Toprol XL, Aspirin, Plavix. He states he took these medications for a couple of days and stopped all of them and started taking "vitamins." It appears patient did not have a clear understanding of his medical diagnosis and condition upon discharge. Patient is currently neurologically back to baseline (hx of Bell's Palsy), with no complaints of chest pain, dyspnea or palpitations.\par \par There is no history of MI, CHF, or previous coronary intervention.\par

## 2023-07-12 ENCOUNTER — APPOINTMENT (OUTPATIENT)
Dept: FAMILY MEDICINE | Facility: CLINIC | Age: 73
End: 2023-07-12
Payer: MEDICARE

## 2023-07-12 VITALS
HEIGHT: 66 IN | WEIGHT: 181 LBS | OXYGEN SATURATION: 98 % | TEMPERATURE: 98.2 F | RESPIRATION RATE: 14 BRPM | SYSTOLIC BLOOD PRESSURE: 124 MMHG | DIASTOLIC BLOOD PRESSURE: 80 MMHG | BODY MASS INDEX: 29.09 KG/M2 | HEART RATE: 78 BPM

## 2023-07-12 PROCEDURE — 99214 OFFICE O/P EST MOD 30 MIN: CPT

## 2023-07-12 NOTE — PHYSICAL EXAM
[Well Nourished] : well nourished [Well Developed] : well developed [Well-Appearing] : well-appearing [Normal Sclera/Conjunctiva] : normal sclera/conjunctiva [PERRL] : pupils equal round and reactive to light [EOMI] : extraocular movements intact [Normal Outer Ear/Nose] : the outer ears and nose were normal in appearance [Normal Oropharynx] : the oropharynx was normal [No JVD] : no jugular venous distention [No Lymphadenopathy] : no lymphadenopathy [Supple] : supple [Thyroid Normal, No Nodules] : the thyroid was normal and there were no nodules present [No Respiratory Distress] : no respiratory distress  [No Accessory Muscle Use] : no accessory muscle use [Clear to Auscultation] : lungs were clear to auscultation bilaterally [Normal Rate] : normal rate  [Regular Rhythm] : with a regular rhythm [Normal S1, S2] : normal S1 and S2 [No Murmur] : no murmur heard [No Carotid Bruits] : no carotid bruits [No Abdominal Bruit] : a ~M bruit was not heard ~T in the abdomen [No Varicosities] : no varicosities [Pedal Pulses Present] : the pedal pulses are present [No Edema] : there was no peripheral edema [No Palpable Aorta] : no palpable aorta [No Extremity Clubbing/Cyanosis] : no extremity clubbing/cyanosis [Soft] : abdomen soft [Non Tender] : non-tender [Non-distended] : non-distended [No Masses] : no abdominal mass palpated [No HSM] : no HSM [Normal Bowel Sounds] : normal bowel sounds [No CVA Tenderness] : no CVA  tenderness [No Spinal Tenderness] : no spinal tenderness [No Joint Swelling] : no joint swelling [Grossly Normal Strength/Tone] : grossly normal strength/tone [No Rash] : no rash [Coordination Grossly Intact] : coordination grossly intact [No Focal Deficits] : no focal deficits [Normal Gait] : normal gait [Deep Tendon Reflexes (DTR)] : deep tendon reflexes were 2+ and symmetric [Normal Affect] : the affect was normal [Normal Insight/Judgement] : insight and judgment were intact [de-identified] : facial nerve paralysis

## 2023-07-12 NOTE — REVIEW OF SYSTEMS
[Palpitations] : palpitations [Anxiety] : anxiety [Negative] : Heme/Lymph [Fever] : no fever [Chills] : no chills [Hot Flashes] : no hot flashes [Pain] : no pain [Redness] : no redness [Itching] : no itching [Chest Pain] : no chest pain [Claudication] : no  leg claudication [Lower Ext Edema] : no lower extremity edema [Orthopena] : no orthopnea [Shortness Of Breath] : no shortness of breath [Wheezing] : no wheezing [Cough] : no cough [Dyspnea on Exertion] : not dyspnea on exertion [Abdominal Pain] : no abdominal pain [Constipation] : no constipation [Melena] : no melena [Incontinence] : no incontinence [Hesitancy] : no hesitancy [Frequency] : no frequency [Joint Pain] : no joint pain [Joint Stiffness] : no joint stiffness [Muscle Pain] : no muscle pain [Back Pain] : no back pain [Itching] : no itching [Nail Changes] : no nail changes [Skin Rash] : no skin rash [Dizziness] : no dizziness [Fainting] : no fainting [Suicidal] : not suicidal [Insomnia] : no insomnia [Depression] : no depression [de-identified] : facial paralysis

## 2023-07-12 NOTE — HEALTH RISK ASSESSMENT
[Intercurrent ED visits] : went to ED [Intercurrent hospitalizations] : was admitted to the hospital  [No] : In the past 12 months have you used drugs other than those required for medical reasons? No [No falls in past year] : Patient reported no falls in the past year [0] : 2) Feeling down, depressed, or hopeless: Not at all (0) [PHQ-2 Negative - No further assessment needed] : PHQ-2 Negative - No further assessment needed [Patient/Caregiver not ready to engage] : , patient/caregiver not ready to engage [Never] : Never [Good] : ~his/her~  mood as  good [Patient declined Low Dose CT Scan] : Patient declined Low Dose CT Scan [Patient declined Retinal Exam] : Patient declined Retinal Exam. [Patient declined mammogram] : Patient declined mammogram [Patient declined PAP Smear] : Patient declined PAP Smear [Patient declined bone density test] : Patient declined bone density test [Patient declined colonoscopy] : Patient declined colonoscopy [None] : None [With Significant Other] : lives with significant other [Significant Other] : lives with significant other [# Of Children ___] : has [unfilled] children [Sexually Active] : sexually active [Feels Safe at Home] : Feels safe at home [Fully functional (bathing, dressing, toileting, transferring, walking, feeding)] : Fully functional (bathing, dressing, toileting, transferring, walking, feeding) [Fully functional (using the telephone, shopping, preparing meals, housekeeping, doing laundry, using] : Fully functional and needs no help or supervision to perform IADLs (using the telephone, shopping, preparing meals, housekeeping, doing laundry, using transportation, managing medications and managing finances) [Reports normal functional visual acuity (ie: able to read med bottle)] : Reports normal functional visual acuity [Smoke Detector] : smoke detector [Carbon Monoxide Detector] : carbon monoxide detector [Safety elements used in home] : safety elements used in home [Seat Belt] :  uses seat belt [Sunscreen] : uses sunscreen [LowDoseCTScan] : 5/23 [EyeExamDate] : 5/23 [Change in mental status noted] : No change in mental status noted [Language] : denies difficulty with language [Behavior] : denies difficulty with behavior [Learning/Retaining New Information] : denies difficulty learning/retaining new information [Handling Complex Tasks] : denies difficulty handling complex tasks [Reasoning] : denies difficulty with reasoning [Spatial Ability and Orientation] : denies difficulty with spatial ability and orientation [High Risk Behavior] : no high risk behavior [Reports changes in hearing] : Reports no changes in hearing [Reports changes in vision] : Reports no changes in vision [Reports changes in dental health] : Reports no changes in dental health [Guns at Home] : no guns at home [Travel to Developing Areas] : does not  travel to developing areas [TB Exposure] : is not being exposed to tuberculosis [Caregiver Concerns] : does not have caregiver concerns [MammogramDate] : 5/23 [PapSmearDate] : 5/23 [BoneDensityDate] : 5/23 [ColonoscopyDate] : 5/23 [HIVDate] : 9/2020 [HepatitisCDate] : 9/2020 [Audit-CScore] : 0 [[facility]] : 8 [[id number]] : 0 [AdvancecareDate] : 7/23

## 2023-07-12 NOTE — ASSESSMENT
[FreeTextEntry1] : 73 y/o M pt w pmho post- herpetic neuralgia, Systolic HF, Ischemic stroke, HLD, HTN, facial paralysis, GERD and arrhythmia, .  F/u after seeing the cardiologist and pain on right arm from- post herpetic neualigia. Denies SOB, CP, edema, fever.\par \par Advised to continue taking all medication from Cardiologist\par Started on Gabapentin 100mg for postherpetic neuralgia\par \par F/u in 1 month.

## 2023-07-12 NOTE — HISTORY OF PRESENT ILLNESS
[FreeTextEntry1] : follow up  [de-identified] : 71 y/o M pt w pmho post- herpetic neuralgia, Systolic HF, Ischemic stroke, HLD, HTN, facial paralysis, GERD and arrhythmia, .  F/u after seeing the cardiologist and pain on right arm from- post herpetic neualigia. Denies SOB, CP, edema, fever\par

## 2023-07-14 ENCOUNTER — NON-APPOINTMENT (OUTPATIENT)
Age: 73
End: 2023-07-14

## 2023-08-04 ENCOUNTER — APPOINTMENT (OUTPATIENT)
Dept: CARDIOLOGY | Facility: CLINIC | Age: 73
End: 2023-08-04

## 2023-08-09 ENCOUNTER — APPOINTMENT (OUTPATIENT)
Dept: CARDIOLOGY | Facility: CLINIC | Age: 73
End: 2023-08-09
Payer: MEDICARE

## 2023-08-09 VITALS
HEART RATE: 81 BPM | DIASTOLIC BLOOD PRESSURE: 70 MMHG | WEIGHT: 180 LBS | OXYGEN SATURATION: 98 % | HEIGHT: 66 IN | SYSTOLIC BLOOD PRESSURE: 112 MMHG | BODY MASS INDEX: 28.93 KG/M2

## 2023-08-09 DIAGNOSIS — Z79.899 OTHER LONG TERM (CURRENT) DRUG THERAPY: ICD-10-CM

## 2023-08-09 PROCEDURE — 99215 OFFICE O/P EST HI 40 MIN: CPT

## 2023-08-09 NOTE — CARDIOLOGY SUMMARY
[de-identified] : 7/5/2023, Sinus Tachycardia with first degree AV Block, IVCD [de-identified] : 7/11/2023, LV EF 66%, normal sized atria, trace MR May 2023 ([facility]), LV EF 45% and bubble study demonstrated no evidence of PFO.

## 2023-08-09 NOTE — DISCUSSION/SUMMARY
[FreeTextEntry1] : 1. Chronic Systolic HF/Abnormal ECG: patient euvolemic and asymptomatic. LV EF 45%, reported on [facility] echocardiogram report in May 2023. I recommended repeating the TTE, which patient did in July 2023 on GDMT. LV EF normalized to 66%.  Might have been transient secondary to ischemic stroke at the time. I explained to him that he should be on Entresto 24/26mg BID, Toprol XL 25mg daily, spironolactone 25mg daily, and Jardiance 10mg daily.   2. Hx of ischemic CVAs: occurred May 2023. Appears cardioembolic. Especially given the depressed LV EF and his age, I am highly suspicious for asymptomatic PAF episodes. I am recommending he continue off Aspirin and Plavix. I am recommending continuing Eliquis 5mg BID (high risk medication with no signs of toxicity). Recommend continuing atorvastatin 40mg daily with goal LDL les than 70.  I explained to him the benefits of ILR to screen for PAF. He declined. Also offered Zio patch or 30 day event monitor. He also declined these options.  3. HLD: recommend starting atorvastatin 40mg daily with goal LDL less than 70.  Follow up in 6 months

## 2023-08-09 NOTE — HISTORY OF PRESENT ILLNESS
[FreeTextEntry1] : Historical Perspective: 72 year old male with PMHx of acoustic schwannoma s/p resection in 2007, Sandhu's Palsy, presents after hospitalization at [facility], May 2023 for multiple, bilateral ischemic strokes. Patient states he went to hospital because he was profoundly dizzy. He was discharged with the diagnosis of vertigo. Returned the next day because of profound dizziness, but developed aphasia. MRI of the brain demonstrated bilateral cerebellar (R>L), left posterior temporal lobe and peritrigonal region ischemic infarcts. Patient underwent TTE, which reported an LV EF 45% with diffuse hypokinesis and a bubble study demonstrated no evidence of PFO. Patient was discharged on Entresto, Spironolactone, Toprol XL, Aspirin, Plavix. He states he took these medications for a couple of days and stopped all of them and started taking "vitamins." It appears patient did not have a clear understanding of his medical diagnosis and condition upon discharge. Patient is currently neurologically back to baseline (hx of Bell's Palsy), with no complaints of chest pain, dyspnea or palpitations.  There is no history of MI, CHF, or previous coronary intervention.  Current Health Status: Patient with no chest pain, SOB, or palpitations. No hospitalizations since seeing me last. Remains compliant with his medications and reports no adverse effects. Opted against having the ILR inserted. Also doesn't want any long term monitoring like Zio patch or 30 Day event monitor.

## 2023-08-09 NOTE — PHYSICAL EXAM
[Normal] : moves all extremities, no focal deficits, normal speech [de-identified] : No carotid bruits auscultated bilaterally

## 2023-08-14 ENCOUNTER — APPOINTMENT (OUTPATIENT)
Dept: FAMILY MEDICINE | Facility: CLINIC | Age: 73
End: 2023-08-14
Payer: MEDICARE

## 2023-08-14 VITALS
TEMPERATURE: 98 F | DIASTOLIC BLOOD PRESSURE: 80 MMHG | OXYGEN SATURATION: 98 % | BODY MASS INDEX: 28.93 KG/M2 | HEIGHT: 66 IN | HEART RATE: 82 BPM | RESPIRATION RATE: 14 BRPM | SYSTOLIC BLOOD PRESSURE: 110 MMHG | WEIGHT: 180 LBS

## 2023-08-14 DIAGNOSIS — Z00.00 ENCOUNTER FOR GENERAL ADULT MEDICAL EXAMINATION W/OUT ABNORMAL FINDINGS: ICD-10-CM

## 2023-08-14 PROCEDURE — 99214 OFFICE O/P EST MOD 30 MIN: CPT

## 2023-08-14 NOTE — DATA REVIEWED
[No studies available for review at this time.] : No studies available for review at this time. [FreeTextEntry1] : reviewed

## 2023-08-14 NOTE — ASSESSMENT
[FreeTextEntry1] : 73 y/o M pt w pmho post- herpetic neuralgia, Systolic HF, Ischemic stroke, HLD, HTN, facial paralysis, GERD and arrhythmia, .  F/u after seeing the cardiologist and pain on right arm from- post herpetic neualigia. Denies SOB, CP, edema, fever some relief with Gabapentin and motrin   Advised to continue taking all medication from Cardiologist a gabapentin increase- rx 1 in the morning, 2 at dinner and 5 at night CTF 1 mo

## 2023-08-14 NOTE — PHYSICAL EXAM
[Well Nourished] : well nourished [Well Developed] : well developed [Well-Appearing] : well-appearing [Normal Sclera/Conjunctiva] : normal sclera/conjunctiva [PERRL] : pupils equal round and reactive to light [EOMI] : extraocular movements intact [Normal Outer Ear/Nose] : the outer ears and nose were normal in appearance [Normal Oropharynx] : the oropharynx was normal [No JVD] : no jugular venous distention [No Lymphadenopathy] : no lymphadenopathy [Supple] : supple [Thyroid Normal, No Nodules] : the thyroid was normal and there were no nodules present [No Respiratory Distress] : no respiratory distress  [No Accessory Muscle Use] : no accessory muscle use [Clear to Auscultation] : lungs were clear to auscultation bilaterally [Normal Rate] : normal rate  [Regular Rhythm] : with a regular rhythm [Normal S1, S2] : normal S1 and S2 [No Murmur] : no murmur heard [No Carotid Bruits] : no carotid bruits [No Abdominal Bruit] : a ~M bruit was not heard ~T in the abdomen [No Varicosities] : no varicosities [Pedal Pulses Present] : the pedal pulses are present [No Edema] : there was no peripheral edema [No Palpable Aorta] : no palpable aorta [No Extremity Clubbing/Cyanosis] : no extremity clubbing/cyanosis [Soft] : abdomen soft [Non Tender] : non-tender [Non-distended] : non-distended [No Masses] : no abdominal mass palpated [No HSM] : no HSM [Normal Bowel Sounds] : normal bowel sounds [No CVA Tenderness] : no CVA  tenderness [No Spinal Tenderness] : no spinal tenderness [No Joint Swelling] : no joint swelling [Grossly Normal Strength/Tone] : grossly normal strength/tone [No Rash] : no rash [Coordination Grossly Intact] : coordination grossly intact [No Focal Deficits] : no focal deficits [Normal Gait] : normal gait [Deep Tendon Reflexes (DTR)] : deep tendon reflexes were 2+ and symmetric [Normal Affect] : the affect was normal [Normal Insight/Judgement] : insight and judgment were intact [Normal] : moves all extremities, no focal deficits, normal speech [de-identified] : CHF [de-identified] : facial nerve paralysis [de-identified] : No carotid bruits auscultated bilaterally

## 2023-08-14 NOTE — HEALTH RISK ASSESSMENT
[Intercurrent ED visits] : went to ED [Intercurrent hospitalizations] : was admitted to the hospital  [No] : In the past 12 months have you used drugs other than those required for medical reasons? No [No falls in past year] : Patient reported no falls in the past year [0] : 2) Feeling down, depressed, or hopeless: Not at all (0) [PHQ-2 Negative - No further assessment needed] : PHQ-2 Negative - No further assessment needed [Patient/Caregiver not ready to engage] : , patient/caregiver not ready to engage [Never] : Never [Good] : ~his/her~  mood as  good [Audit-CScore] : 0 [facility] [[id number]] : 0 [AdvancecareDate] : 7/23

## 2023-08-14 NOTE — REVIEW OF SYSTEMS
[Anxiety] : anxiety [Negative] : Heme/Lymph [Joint Pain] : joint pain [Muscle Pain] : muscle pain [Fever] : no fever [Chills] : no chills [Hot Flashes] : no hot flashes [Pain] : no pain [Redness] : no redness [Itching] : no itching [Chest Pain] : no chest pain [Palpitations] : no palpitations [Claudication] : no  leg claudication [Lower Ext Edema] : no lower extremity edema [Orthopena] : no orthopnea [Shortness Of Breath] : no shortness of breath [Wheezing] : no wheezing [Cough] : no cough [Dyspnea on Exertion] : not dyspnea on exertion [Abdominal Pain] : no abdominal pain [Constipation] : no constipation [Melena] : no melena [Incontinence] : no incontinence [Hesitancy] : no hesitancy [Frequency] : no frequency [Joint Stiffness] : no joint stiffness [Back Pain] : no back pain [Itching] : no itching [Nail Changes] : no nail changes [Skin Rash] : no skin rash [Dizziness] : no dizziness [Fainting] : no fainting [Suicidal] : not suicidal [Insomnia] : no insomnia [Depression] : no depression [FreeTextEntry9] : right arm pain [de-identified] : facial paralysis

## 2023-08-14 NOTE — HISTORY OF PRESENT ILLNESS
[FreeTextEntry1] : 73 y/o M pt w pmho post- herpetic neuralgia, Systolic HF, Ischemic stroke, HLD, HTN, facial paralysis, GERD and arrhythmia, .  F/u after seeing the cardiologist and pain on right arm from- post herpetic neualigia. Denies SOB, CP, edema, fever some relief with Gabapentin and motrin

## 2023-08-15 LAB
24R-OH-CALCIDIOL SERPL-MCNC: 44.6 PG/ML
ALBUMIN SERPL ELPH-MCNC: 4.3 G/DL
ALP BLD-CCNC: 78 U/L
ALT SERPL-CCNC: 24 U/L
ANION GAP SERPL CALC-SCNC: 10 MMOL/L
APPEARANCE: CLEAR
AST SERPL-CCNC: 22 U/L
BILIRUB SERPL-MCNC: 0.4 MG/DL
BILIRUBIN URINE: NEGATIVE
BLOOD URINE: NEGATIVE
BUN SERPL-MCNC: 21 MG/DL
CALCIUM SERPL-MCNC: 10.2 MG/DL
CHLORIDE SERPL-SCNC: 106 MMOL/L
CO2 SERPL-SCNC: 25 MMOL/L
COLOR: NORMAL
CREAT SERPL-MCNC: 1.18 MG/DL
EGFR: 65 ML/MIN/1.73M2
ESTIMATED AVERAGE GLUCOSE: 117 MG/DL
GLUCOSE QUALITATIVE U: >=1000 MG/DL
GLUCOSE SERPL-MCNC: 96 MG/DL
HBA1C MFR BLD HPLC: 5.7 %
KETONES URINE: NEGATIVE MG/DL
LEUKOCYTE ESTERASE URINE: NEGATIVE
NITRITE URINE: NEGATIVE
PH URINE: 5.5
POTASSIUM SERPL-SCNC: 4.2 MMOL/L
PROT SERPL-MCNC: 6.8 G/DL
PROTEIN URINE: NEGATIVE MG/DL
SODIUM SERPL-SCNC: 141 MMOL/L
SPECIFIC GRAVITY URINE: >1.03
UROBILINOGEN URINE: 0.2 MG/DL

## 2023-08-18 LAB
CHOLEST SERPL-MCNC: 176 MG/DL
HDLC SERPL-MCNC: 64 MG/DL
LDLC SERPL CALC-MCNC: 94 MG/DL
NONHDLC SERPL-MCNC: 112 MG/DL
TRIGL SERPL-MCNC: 102 MG/DL

## 2023-10-16 ENCOUNTER — NON-APPOINTMENT (OUTPATIENT)
Age: 73
End: 2023-10-16

## 2023-10-23 ENCOUNTER — APPOINTMENT (OUTPATIENT)
Dept: FAMILY MEDICINE | Facility: CLINIC | Age: 73
End: 2023-10-23
Payer: MEDICARE

## 2023-10-23 VITALS
DIASTOLIC BLOOD PRESSURE: 82 MMHG | HEIGHT: 66 IN | WEIGHT: 186 LBS | BODY MASS INDEX: 29.89 KG/M2 | SYSTOLIC BLOOD PRESSURE: 118 MMHG

## 2023-10-23 DIAGNOSIS — G51.0 BELL'S PALSY: ICD-10-CM

## 2023-10-23 DIAGNOSIS — Z79.01 LONG TERM (CURRENT) USE OF ANTICOAGULANTS: ICD-10-CM

## 2023-10-23 DIAGNOSIS — Z23 ENCOUNTER FOR IMMUNIZATION: ICD-10-CM

## 2023-10-23 PROCEDURE — 99214 OFFICE O/P EST MOD 30 MIN: CPT | Mod: 25

## 2023-10-23 PROCEDURE — G0008: CPT

## 2023-10-23 PROCEDURE — 90662 IIV NO PRSV INCREASED AG IM: CPT

## 2023-10-24 PROBLEM — Z79.01 ON CONTINUOUS ORAL ANTICOAGULATION: Status: ACTIVE | Noted: 2023-08-09

## 2023-10-24 LAB
ALBUMIN SERPL ELPH-MCNC: 4.3 G/DL
ALP BLD-CCNC: 72 U/L
ALT SERPL-CCNC: 19 U/L
ANION GAP SERPL CALC-SCNC: 14 MMOL/L
AST SERPL-CCNC: 20 U/L
BILIRUB SERPL-MCNC: 0.2 MG/DL
BUN SERPL-MCNC: 23 MG/DL
CALCIUM SERPL-MCNC: 9.5 MG/DL
CHLORIDE SERPL-SCNC: 106 MMOL/L
CO2 SERPL-SCNC: 22 MMOL/L
CREAT SERPL-MCNC: 1.06 MG/DL
EGFR: 74 ML/MIN/1.73M2
GLUCOSE SERPL-MCNC: 105 MG/DL
POTASSIUM SERPL-SCNC: 4.5 MMOL/L
PROT SERPL-MCNC: 6.6 G/DL
SODIUM SERPL-SCNC: 142 MMOL/L

## 2023-10-26 LAB
BASOPHILS # BLD AUTO: 0.04 K/UL
BASOPHILS NFR BLD AUTO: 0.4 %
EOSINOPHIL # BLD AUTO: 0.06 K/UL
EOSINOPHIL NFR BLD AUTO: 0.7 %
HCT VFR BLD CALC: 47.7 %
HGB BLD-MCNC: 15.3 G/DL
IMM GRANULOCYTES NFR BLD AUTO: 0.3 %
LYMPHOCYTES # BLD AUTO: 3.24 K/UL
LYMPHOCYTES NFR BLD AUTO: 35.8 %
MAN DIFF?: NORMAL
MCHC RBC-ENTMCNC: 31 PG
MCHC RBC-ENTMCNC: 32.1 GM/DL
MCV RBC AUTO: 96.8 FL
MONOCYTES # BLD AUTO: 0.56 K/UL
MONOCYTES NFR BLD AUTO: 6.2 %
NEUTROPHILS # BLD AUTO: 5.11 K/UL
NEUTROPHILS NFR BLD AUTO: 56.6 %
PLATELET # BLD AUTO: 304 K/UL
RBC # BLD: 4.93 M/UL
RBC # FLD: 13.4 %
WBC # FLD AUTO: 9.04 K/UL

## 2023-11-29 ENCOUNTER — APPOINTMENT (OUTPATIENT)
Dept: FAMILY MEDICINE | Facility: CLINIC | Age: 73
End: 2023-11-29
Payer: MEDICARE

## 2023-11-29 VITALS
HEIGHT: 66 IN | HEART RATE: 97 BPM | SYSTOLIC BLOOD PRESSURE: 132 MMHG | WEIGHT: 185 LBS | TEMPERATURE: 98.3 F | BODY MASS INDEX: 29.73 KG/M2 | OXYGEN SATURATION: 97 % | RESPIRATION RATE: 14 BRPM | DIASTOLIC BLOOD PRESSURE: 86 MMHG

## 2023-11-29 PROCEDURE — 99214 OFFICE O/P EST MOD 30 MIN: CPT | Mod: 25

## 2023-11-29 PROCEDURE — 81003 URINALYSIS AUTO W/O SCOPE: CPT | Mod: QW

## 2023-11-30 LAB
ALBUMIN SERPL ELPH-MCNC: 4.1 G/DL
ALP BLD-CCNC: 59 U/L
ALT SERPL-CCNC: 16 U/L
AMYLASE/CREAT SERPL: 47 U/L
ANION GAP SERPL CALC-SCNC: 12 MMOL/L
AST SERPL-CCNC: 16 U/L
BASOPHILS # BLD AUTO: 0.05 K/UL
BASOPHILS NFR BLD AUTO: 0.4 %
BILIRUB SERPL-MCNC: 0.7 MG/DL
BUN SERPL-MCNC: 27 MG/DL
CALCIUM SERPL-MCNC: 9.5 MG/DL
CHLORIDE SERPL-SCNC: 105 MMOL/L
CO2 SERPL-SCNC: 24 MMOL/L
CREAT SERPL-MCNC: 1.81 MG/DL
EGFR: 39 ML/MIN/1.73M2
EOSINOPHIL # BLD AUTO: 0.05 K/UL
EOSINOPHIL NFR BLD AUTO: 0.4 %
GLUCOSE SERPL-MCNC: 97 MG/DL
HCT VFR BLD CALC: 47.7 %
HGB BLD-MCNC: 15.6 G/DL
IMM GRANULOCYTES NFR BLD AUTO: 0.3 %
LPL SERPL-CCNC: 12 U/L
LYMPHOCYTES # BLD AUTO: 3.13 K/UL
LYMPHOCYTES NFR BLD AUTO: 23.6 %
MAN DIFF?: NORMAL
MCHC RBC-ENTMCNC: 30.8 PG
MCHC RBC-ENTMCNC: 32.7 GM/DL
MCV RBC AUTO: 94.3 FL
MONOCYTES # BLD AUTO: 1.02 K/UL
MONOCYTES NFR BLD AUTO: 7.7 %
NEUTROPHILS # BLD AUTO: 8.95 K/UL
NEUTROPHILS NFR BLD AUTO: 67.6 %
PLATELET # BLD AUTO: 346 K/UL
POTASSIUM SERPL-SCNC: 4.2 MMOL/L
PROT SERPL-MCNC: 6.9 G/DL
RBC # BLD: 5.06 M/UL
RBC # FLD: 13.4 %
SODIUM SERPL-SCNC: 141 MMOL/L
WBC # FLD AUTO: 13.24 K/UL

## 2023-12-13 NOTE — ASSESSMENT
[FreeTextEntry1] : 73-year-old male with a PMH of HLD, Facial nerve paralysis, Ischemic stroke 9 months ago. anxiety, BPH, Acoustic neuroma complain of LLQ pain x 3 days. reports he gave himself an enema OTC and had a small bowel movement. before the enema he was constipated and did have a BM for three days. denies any blood in the stool, N/VD. fevers, myalgias. Denies any previous abdominal surgery. has not had a colonoscopy for over 20 years.   1. Acute LLQ pain W/ Constipation

## 2023-12-13 NOTE — HISTORY OF PRESENT ILLNESS
[FreeTextEntry8] : 73-year-old male with a PMH of HLD, Facial nerve paralysis, Ischemic stroke 9 months ago. anxiety, BPH, Acoustic neuroma complain of LLQ pain x 3 days. reports he gave himself an enema OTC and had a small bowel movement. before the enema he was constipated and did have a BM for three days. denies any blood in the stool, N/VD. fevers, myalgias. Denies any previous abdominal surgery. has not had a colonoscopy for over 20 years.

## 2023-12-13 NOTE — PLAN
[FreeTextEntry1] : Acute LLQ pain W/ Constipation  - Cipro 500mg BID x 10 days - CT scan of abdomen and pelvis ordered  - CBC, CMP amylase and lipase ordered - F/u in 10 days

## 2023-12-13 NOTE — HEALTH RISK ASSESSMENT
[Audit-CScore] : 1 [de-identified] : average  [de-identified] : average  [facility]] : 7 [[id number]] : 0

## 2023-12-14 ENCOUNTER — APPOINTMENT (OUTPATIENT)
Dept: FAMILY MEDICINE | Facility: CLINIC | Age: 73
End: 2023-12-14

## 2023-12-14 NOTE — REVIEW OF SYSTEMS
[Abdominal Pain] : abdominal pain [Constipation] : constipation [Negative] : Heme/Lymph [FreeTextEntry7] : LLQ pain, constipation

## 2023-12-14 NOTE — HEALTH RISK ASSESSMENT
[Yes] : Yes [Monthly or less (1 pt)] : Monthly or less (1 point) [1 or 2 (0 pts)] : 1 or 2 (0 points) [Never (0 pts)] : Never (0 points) [No falls in past year] : Patient reported no falls in the past year [0] : 2) Feeling down, depressed, or hopeless: Not at all (0) [PHQ-2 Negative - No further assessment needed] : PHQ-2 Negative - No further assessment needed [Audit-CScore] : 1 [de-identified] : average  [de-identified] : average  [facility] 7 [[id number]] : 0 [0-4] : 0-4 [Never] : Never

## 2023-12-14 NOTE — PHYSICAL EXAM
[No Acute Distress] : no acute distress [Well Nourished] : well nourished [Well Developed] : well developed [Well-Appearing] : well-appearing [Normal Sclera/Conjunctiva] : normal sclera/conjunctiva [PERRL] : pupils equal round and reactive to light [EOMI] : extraocular movements intact [Normal Outer Ear/Nose] : the outer ears and nose were normal in appearance [Normal Oropharynx] : the oropharynx was normal [No JVD] : no jugular venous distention [No Lymphadenopathy] : no lymphadenopathy [Supple] : supple [Thyroid Normal, No Nodules] : the thyroid was normal and there were no nodules present [No Respiratory Distress] : no respiratory distress  [No Accessory Muscle Use] : no accessory muscle use [Clear to Auscultation] : lungs were clear to auscultation bilaterally [Normal Rate] : normal rate  [Regular Rhythm] : with a regular rhythm [Normal S1, S2] : normal S1 and S2 [No Murmur] : no murmur heard [No Carotid Bruits] : no carotid bruits [No Abdominal Bruit] : a ~M bruit was not heard ~T in the abdomen [No Varicosities] : no varicosities [Pedal Pulses Present] : the pedal pulses are present [No Edema] : there was no peripheral edema [No Palpable Aorta] : no palpable aorta [No Extremity Clubbing/Cyanosis] : no extremity clubbing/cyanosis [Soft] : abdomen soft [Non-distended] : non-distended [No Masses] : no abdominal mass palpated [No HSM] : no HSM [Normal Bowel Sounds] : normal bowel sounds [Normal Posterior Cervical Nodes] : no posterior cervical lymphadenopathy [Normal Anterior Cervical Nodes] : no anterior cervical lymphadenopathy [No CVA Tenderness] : no CVA  tenderness [No Spinal Tenderness] : no spinal tenderness [No Joint Swelling] : no joint swelling [Grossly Normal Strength/Tone] : grossly normal strength/tone [No Rash] : no rash [Coordination Grossly Intact] : coordination grossly intact [No Focal Deficits] : no focal deficits [Normal Gait] : normal gait [Deep Tendon Reflexes (DTR)] : deep tendon reflexes were 2+ and symmetric [Normal Affect] : the affect was normal [Normal Insight/Judgement] : insight and judgment were intact [de-identified] : tenderness in the LLQ

## 2024-02-07 ENCOUNTER — NON-APPOINTMENT (OUTPATIENT)
Age: 74
End: 2024-02-07

## 2024-02-07 ENCOUNTER — APPOINTMENT (OUTPATIENT)
Dept: CARDIOLOGY | Facility: CLINIC | Age: 74
End: 2024-02-07
Payer: MEDICARE

## 2024-02-07 VITALS
DIASTOLIC BLOOD PRESSURE: 72 MMHG | OXYGEN SATURATION: 97 % | SYSTOLIC BLOOD PRESSURE: 136 MMHG | HEIGHT: 66 IN | WEIGHT: 185 LBS | HEART RATE: 85 BPM | BODY MASS INDEX: 29.73 KG/M2

## 2024-02-07 DIAGNOSIS — Z86.73 PERSONAL HISTORY OF TRANSIENT ISCHEMIC ATTACK (TIA), AND CEREBRAL INFARCTION W/OUT RESIDUAL DEFICITS: ICD-10-CM

## 2024-02-07 PROCEDURE — 93000 ELECTROCARDIOGRAM COMPLETE: CPT

## 2024-02-07 PROCEDURE — 99214 OFFICE O/P EST MOD 30 MIN: CPT

## 2024-02-07 PROCEDURE — G2211 COMPLEX E/M VISIT ADD ON: CPT

## 2024-02-07 RX ORDER — CIPROFLOXACIN HYDROCHLORIDE 500 MG/1
500 TABLET, FILM COATED ORAL TWICE DAILY
Qty: 20 | Refills: 0 | Status: DISCONTINUED | COMMUNITY
Start: 2023-11-29 | End: 2024-02-07

## 2024-02-07 RX ORDER — ATORVASTATIN CALCIUM 40 MG/1
40 TABLET, FILM COATED ORAL
Qty: 90 | Refills: 3 | Status: DISCONTINUED | COMMUNITY
Start: 2023-07-05 | End: 2024-02-07

## 2024-02-07 RX ORDER — APIXABAN 5 MG/1
5 TABLET, FILM COATED ORAL
Qty: 90 | Refills: 3 | Status: DISCONTINUED | COMMUNITY
Start: 2023-07-05 | End: 2024-02-07

## 2024-02-07 RX ORDER — SPIRONOLACTONE 25 MG/1
25 TABLET ORAL DAILY
Qty: 90 | Refills: 3 | Status: DISCONTINUED | COMMUNITY
Start: 2023-07-05 | End: 2024-02-07

## 2024-02-07 RX ORDER — GABAPENTIN 100 MG/1
100 CAPSULE ORAL 3 TIMES DAILY
Qty: 810 | Refills: 0 | Status: DISCONTINUED | COMMUNITY
Start: 2023-08-14 | End: 2024-02-07

## 2024-02-07 RX ORDER — SACUBITRIL AND VALSARTAN 24; 26 MG/1; MG/1
24-26 TABLET, FILM COATED ORAL
Qty: 180 | Refills: 3 | Status: DISCONTINUED | COMMUNITY
Start: 2023-07-05 | End: 2024-02-07

## 2024-02-07 RX ORDER — EMPAGLIFLOZIN 10 MG/1
10 TABLET, FILM COATED ORAL DAILY
Qty: 90 | Refills: 3 | Status: DISCONTINUED | COMMUNITY
Start: 2023-07-05 | End: 2024-02-07

## 2024-02-07 RX ORDER — METOPROLOL SUCCINATE 25 MG/1
25 TABLET, EXTENDED RELEASE ORAL DAILY
Qty: 90 | Refills: 3 | Status: DISCONTINUED | COMMUNITY
Start: 2023-07-05 | End: 2024-02-07

## 2024-02-07 RX ORDER — GABAPENTIN 100 MG/1
100 CAPSULE ORAL 3 TIMES DAILY
Qty: 270 | Refills: 0 | Status: DISCONTINUED | COMMUNITY
Start: 2023-07-12 | End: 2024-02-07

## 2024-02-07 NOTE — CARDIOLOGY SUMMARY
[de-identified] : 2/7/2024, NSR with PVC, right axis deviation  7/5/2023, Sinus Tachycardia with first degree AV Block, IVCD [de-identified] : 7/11/2023, LV EF 66%, normal sized atria, trace MR May 2023 ([facility]), LV EF 45% and bubble study demonstrated no evidence of PFO.

## 2024-02-07 NOTE — HISTORY OF PRESENT ILLNESS
[FreeTextEntry1] : Historical Perspective: 72 year old male with PMHx of acoustic schwannoma s/p resection in 2007, Sandhu's Palsy, presents after hospitalization at [facility], May 2023 for multiple, bilateral ischemic strokes. Patient states he went to hospital because he was profoundly dizzy. He was discharged with the diagnosis of vertigo. Returned the next day because of profound dizziness, but developed aphasia. MRI of the brain demonstrated bilateral cerebellar (R>L), left posterior temporal lobe and peritrigonal region ischemic infarcts. Patient underwent TTE, which reported an LV EF 45% with diffuse hypokinesis and a bubble study demonstrated no evidence of PFO. Patient was discharged on Entresto, Spironolactone, Toprol XL, Aspirin, Plavix. He states he took these medications for a couple of days and stopped all of them and started taking "vitamins." It appears patient did not have a clear understanding of his medical diagnosis and condition upon discharge. Patient is currently neurologically back to baseline (hx of Bell's Palsy), with no complaints of chest pain, dyspnea or palpitations.  There is no history of MI, CHF, or previous coronary intervention.  Current Health Status: Patient with no chest pain, SOB, or palpitations. No hospitalizations since seeing me last. 2 weeks after seeing me last visit, patient states that he stopped all his medications. He did not notify the office of this decision. He states he doesn't want to be on medications.  Opted against having the ILR inserted. Also doesn't want any long term monitoring like Zio patch or 30 Day event monitor.

## 2024-02-07 NOTE — DISCUSSION/SUMMARY
[FreeTextEntry1] : 1. Chronic Systolic HF/Abnormal ECG: patient euvolemic and asymptomatic. LV EF 45%, reported on [facility] echocardiogram report in May 2023. I recommended repeating the TTE, which patient did in July 2023 on GDMT. LV EF normalized to 66%.  Might have been transient secondary to ischemic stroke at the time. I explained to him that he should be on Entresto 24/26mg BID, Toprol XL 25mg daily, spironolactone 25mg daily, and Jardiance 10mg daily. Patient states he stopped these medications and doesn't want to be on any medications. Advised on the risks, which include MI, HF, Stroke all of which can result in his disability or death. He states he understands. Recommend periodic echo surveillance.   2. Hx of ischemic CVAs: occurred May 2023. Appears cardioembolic. Especially given the depressed LV EF and his age, I am highly suspicious for asymptomatic PAF episodes. I recommended he continue off Aspirin and Plavix. I recommended continuing Eliquis 5mg BID and atorvastatin 40mg daily with goal LDL les than 70. Patient states he stopped these medications and doesn't want to be on any medications. Advised on the risks, which include MI, HF, Stroke all of which can result in his disability or death. He states he understands. Recommend periodic echo surveillance.  I explained to him the benefits of ILR to screen for PAF. He declined. Also offered Zio patch or 30 day event monitor. He also declined these options.  3. HLD: recommend continuing atorvastatin 40mg daily with goal LDL less than 70. Declines therapy as described above.   Follow up in 6 months [EKG obtained to assist in diagnosis and management of assessed problem(s)] : EKG obtained to assist in diagnosis and management of assessed problem(s)

## 2024-02-07 NOTE — PHYSICAL EXAM
[Normal] : moves all extremities, no focal deficits, normal speech [de-identified] : No carotid bruits auscultated bilaterally

## 2024-03-04 ENCOUNTER — APPOINTMENT (OUTPATIENT)
Dept: FAMILY MEDICINE | Facility: CLINIC | Age: 74
End: 2024-03-04
Payer: MEDICARE

## 2024-03-04 VITALS
BODY MASS INDEX: 29.73 KG/M2 | DIASTOLIC BLOOD PRESSURE: 86 MMHG | HEART RATE: 86 BPM | WEIGHT: 185 LBS | SYSTOLIC BLOOD PRESSURE: 134 MMHG | OXYGEN SATURATION: 98 % | HEIGHT: 66 IN | RESPIRATION RATE: 14 BRPM

## 2024-03-04 DIAGNOSIS — K59.00 CONSTIPATION, UNSPECIFIED: ICD-10-CM

## 2024-03-04 DIAGNOSIS — B02.29 OTHER POSTHERPETIC NERVOUS SYSTEM INVOLVEMENT: ICD-10-CM

## 2024-03-04 DIAGNOSIS — K62.5 HEMORRHAGE OF ANUS AND RECTUM: ICD-10-CM

## 2024-03-04 DIAGNOSIS — F41.8 OTHER SPECIFIED ANXIETY DISORDERS: ICD-10-CM

## 2024-03-04 DIAGNOSIS — N40.0 BENIGN PROSTATIC HYPERPLASIA WITHOUT LOWER URINARY TRACT SYMPMS: ICD-10-CM

## 2024-03-04 DIAGNOSIS — Z87.2 PERSONAL HISTORY OF DISEASES OF THE SKIN AND SUBCUTANEOUS TISSUE: ICD-10-CM

## 2024-03-04 DIAGNOSIS — Z86.59 PERSONAL HISTORY OF OTHER MENTAL AND BEHAVIORAL DISORDERS: ICD-10-CM

## 2024-03-04 DIAGNOSIS — R10.32 LEFT LOWER QUADRANT PAIN: ICD-10-CM

## 2024-03-04 DIAGNOSIS — L23.7 ALLERGIC CONTACT DERMATITIS DUE TO PLANTS, EXCEPT FOOD: ICD-10-CM

## 2024-03-04 DIAGNOSIS — Z12.5 ENCOUNTER FOR SCREENING FOR MALIGNANT NEOPLASM OF PROSTATE: ICD-10-CM

## 2024-03-04 DIAGNOSIS — L25.5 UNSPECIFIED CONTACT DERMATITIS DUE TO PLANTS, EXCEPT FOOD: ICD-10-CM

## 2024-03-04 DIAGNOSIS — F51.02 ADJUSTMENT INSOMNIA: ICD-10-CM

## 2024-03-04 PROCEDURE — 99215 OFFICE O/P EST HI 40 MIN: CPT

## 2024-03-04 PROCEDURE — G2211 COMPLEX E/M VISIT ADD ON: CPT

## 2024-03-04 PROCEDURE — 36415 COLL VENOUS BLD VENIPUNCTURE: CPT

## 2024-03-05 PROBLEM — Z86.59 HISTORY OF ANXIETY: Status: RESOLVED | Noted: 2022-01-26 | Resolved: 2024-03-05

## 2024-03-05 PROBLEM — B02.29 POST HERPETIC NEURALGIA: Status: ACTIVE | Noted: 2023-05-18

## 2024-03-05 PROBLEM — Z87.2 HISTORY OF CELLULITIS: Status: RESOLVED | Noted: 2023-04-05 | Resolved: 2024-03-05

## 2024-03-05 PROBLEM — F41.8 SITUATIONAL ANXIETY: Status: ACTIVE | Noted: 2018-02-23

## 2024-03-05 PROBLEM — Z12.5 PROSTATE CANCER SCREENING: Status: RESOLVED | Noted: 2023-05-31 | Resolved: 2024-03-05

## 2024-03-05 PROBLEM — L23.7 CONTACT DERMATITIS DUE TO POISON IVY: Status: RESOLVED | Noted: 2023-04-20 | Resolved: 2024-03-05

## 2024-03-05 PROBLEM — K62.5 BRBPR (BRIGHT RED BLOOD PER RECTUM): Status: RESOLVED | Noted: 2023-10-23 | Resolved: 2024-03-05

## 2024-03-05 PROBLEM — R10.32 ABDOMINAL PAIN, ACUTE, LEFT LOWER QUADRANT: Status: RESOLVED | Noted: 2023-11-29 | Resolved: 2024-03-05

## 2024-03-05 PROBLEM — L25.5 CONTACT DERMATITIS DUE TO PLANTS, EXCEPT FOOD, UNSPECIFIED CONTACT DERMATITIS TYPE: Status: RESOLVED | Noted: 2023-03-24 | Resolved: 2024-03-05

## 2024-03-05 PROBLEM — K59.00 CONSTIPATION, ACUTE: Status: RESOLVED | Noted: 2023-11-29 | Resolved: 2024-03-05

## 2024-03-05 LAB
24R-OH-CALCIDIOL SERPL-MCNC: 55.4 PG/ML
ALBUMIN SERPL ELPH-MCNC: 4.4 G/DL
ALP BLD-CCNC: 71 U/L
ALT SERPL-CCNC: 26 U/L
ANION GAP SERPL CALC-SCNC: 12 MMOL/L
AST SERPL-CCNC: 25 U/L
BASOPHILS # BLD AUTO: 0.06 K/UL
BASOPHILS NFR BLD AUTO: 0.6 %
BILIRUB SERPL-MCNC: 0.3 MG/DL
BUN SERPL-MCNC: 21 MG/DL
CALCIUM SERPL-MCNC: 10 MG/DL
CHLORIDE SERPL-SCNC: 106 MMOL/L
CHOLEST SERPL-MCNC: 160 MG/DL
CO2 SERPL-SCNC: 25 MMOL/L
CREAT SERPL-MCNC: 1.06 MG/DL
EGFR: 74 ML/MIN/1.73M2
EOSINOPHIL # BLD AUTO: 0.08 K/UL
EOSINOPHIL NFR BLD AUTO: 0.8 %
ESTIMATED AVERAGE GLUCOSE: 120 MG/DL
FOLATE SERPL-MCNC: >20 NG/ML
GLUCOSE SERPL-MCNC: 92 MG/DL
HBA1C MFR BLD HPLC: 5.8 %
HCT VFR BLD CALC: 47.9 %
HDLC SERPL-MCNC: 63 MG/DL
HGB BLD-MCNC: 15.3 G/DL
IMM GRANULOCYTES NFR BLD AUTO: 0.4 %
LDLC SERPL CALC-MCNC: 78 MG/DL
LYMPHOCYTES # BLD AUTO: 3.85 K/UL
LYMPHOCYTES NFR BLD AUTO: 40.2 %
MAN DIFF?: NORMAL
MCHC RBC-ENTMCNC: 29.9 PG
MCHC RBC-ENTMCNC: 31.9 GM/DL
MCV RBC AUTO: 93.7 FL
MONOCYTES # BLD AUTO: 0.55 K/UL
MONOCYTES NFR BLD AUTO: 5.7 %
NEUTROPHILS # BLD AUTO: 4.99 K/UL
NEUTROPHILS NFR BLD AUTO: 52.3 %
NONHDLC SERPL-MCNC: 96 MG/DL
PLATELET # BLD AUTO: 334 K/UL
POTASSIUM SERPL-SCNC: 4.8 MMOL/L
PROT SERPL-MCNC: 6.7 G/DL
PSA SERPL-MCNC: 1 NG/ML
RBC # BLD: 5.11 M/UL
RBC # FLD: 13.4 %
SODIUM SERPL-SCNC: 142 MMOL/L
TRIGL SERPL-MCNC: 99 MG/DL
TSH SERPL-ACNC: 1.71 UIU/ML
VIT B12 SERPL-MCNC: 849 PG/ML
WBC # FLD AUTO: 9.57 K/UL

## 2024-03-05 NOTE — PHYSICAL EXAM
[No Acute Distress] : no acute distress [Well Nourished] : well nourished [Well Developed] : well developed [Well-Appearing] : well-appearing [Normal Sclera/Conjunctiva] : normal sclera/conjunctiva [PERRL] : pupils equal round and reactive to light [EOMI] : extraocular movements intact [Normal Outer Ear/Nose] : the outer ears and nose were normal in appearance [Normal Oropharynx] : the oropharynx was normal [No JVD] : no jugular venous distention [No Lymphadenopathy] : no lymphadenopathy [Supple] : supple [Thyroid Normal, No Nodules] : the thyroid was normal and there were no nodules present [No Respiratory Distress] : no respiratory distress  [No Accessory Muscle Use] : no accessory muscle use [Clear to Auscultation] : lungs were clear to auscultation bilaterally [Normal Rate] : normal rate  [Regular Rhythm] : with a regular rhythm [Normal S1, S2] : normal S1 and S2 [No Murmur] : no murmur heard [No Carotid Bruits] : no carotid bruits [No Abdominal Bruit] : a ~M bruit was not heard ~T in the abdomen [No Varicosities] : no varicosities [Pedal Pulses Present] : the pedal pulses are present [No Edema] : there was no peripheral edema [No Palpable Aorta] : no palpable aorta [No Extremity Clubbing/Cyanosis] : no extremity clubbing/cyanosis [Soft] : abdomen soft [Non-distended] : non-distended [No Masses] : no abdominal mass palpated [No HSM] : no HSM [Normal Bowel Sounds] : normal bowel sounds [Normal Posterior Cervical Nodes] : no posterior cervical lymphadenopathy [Normal Anterior Cervical Nodes] : no anterior cervical lymphadenopathy [No CVA Tenderness] : no CVA  tenderness [No Spinal Tenderness] : no spinal tenderness [No Joint Swelling] : no joint swelling [Grossly Normal Strength/Tone] : grossly normal strength/tone [No Rash] : no rash [Coordination Grossly Intact] : coordination grossly intact [No Focal Deficits] : no focal deficits [Normal Gait] : normal gait [Deep Tendon Reflexes (DTR)] : deep tendon reflexes were 2+ and symmetric [Normal Affect] : the affect was normal [Normal Insight/Judgement] : insight and judgment were intact [de-identified] : tenderness in the LLQ

## 2024-03-05 NOTE — ASSESSMENT
[FreeTextEntry1] : patient 74 yo male s/p shingles of right arm, diabetic diet controlled, having grief reaction on death of life partner from cancer, needs labs and meds is very physically active PMH of HLD, Facial nerve paralysis, Ischemic stroke months ago. anxiety, BPH, Acoustic neuroma  meds and labs

## 2024-03-05 NOTE — HISTORY OF PRESENT ILLNESS
[FreeTextEntry1] : patient 72 yo male s/p shingles of right arm, diabetic diet controlled, having grief reaction on death of life partner from cancer, needs labs and meds is very physically active

## 2024-03-05 NOTE — HEALTH RISK ASSESSMENT
[Yes] : Yes [Monthly or less (1 pt)] : Monthly or less (1 point) [1 or 2 (0 pts)] : 1 or 2 (0 points) [Never (0 pts)] : Never (0 points) [No falls in past year] : Patient reported no falls in the past year [0] : 2) Feeling down, depressed, or hopeless: Not at all (0) [PHQ-2 Negative - No further assessment needed] : PHQ-2 Negative - No further assessment needed [Never] : Never [0-4] : 0-4 [Audit-CScore] : 1 [de-identified] : average  [de-identified] : average  [[id number]] : 0 [facility] [With Patient/Caregiver] : , with patient/caregiver [AdvancecareDate] : 03/24

## 2024-05-21 ENCOUNTER — APPOINTMENT (OUTPATIENT)
Dept: FAMILY MEDICINE | Facility: CLINIC | Age: 74
End: 2024-05-21
Payer: MEDICARE

## 2024-05-21 VITALS
OXYGEN SATURATION: 98 % | HEART RATE: 80 BPM | SYSTOLIC BLOOD PRESSURE: 130 MMHG | HEIGHT: 66 IN | BODY MASS INDEX: 29.57 KG/M2 | RESPIRATION RATE: 14 BRPM | DIASTOLIC BLOOD PRESSURE: 70 MMHG | WEIGHT: 184 LBS

## 2024-05-21 DIAGNOSIS — I49.9 CARDIAC ARRHYTHMIA, UNSPECIFIED: ICD-10-CM

## 2024-05-21 DIAGNOSIS — H53.9 UNSPECIFIED VISUAL DISTURBANCE: ICD-10-CM

## 2024-05-21 DIAGNOSIS — E78.5 HYPERLIPIDEMIA, UNSPECIFIED: ICD-10-CM

## 2024-05-21 DIAGNOSIS — R94.31 ABNORMAL ELECTROCARDIOGRAM [ECG] [EKG]: ICD-10-CM

## 2024-05-21 DIAGNOSIS — N52.9 MALE ERECTILE DYSFUNCTION, UNSPECIFIED: ICD-10-CM

## 2024-05-21 DIAGNOSIS — R82.90 UNSPECIFIED ABNORMAL FINDINGS IN URINE: ICD-10-CM

## 2024-05-21 DIAGNOSIS — G47.00 INSOMNIA, UNSPECIFIED: ICD-10-CM

## 2024-05-21 DIAGNOSIS — L23.9 ALLERGIC CONTACT DERMATITIS, UNSPECIFIED CAUSE: ICD-10-CM

## 2024-05-21 DIAGNOSIS — D33.3 BENIGN NEOPLASM OF CRANIAL NERVES: ICD-10-CM

## 2024-05-21 DIAGNOSIS — I50.22 CHRONIC SYSTOLIC (CONGESTIVE) HEART FAILURE: ICD-10-CM

## 2024-05-21 DIAGNOSIS — R73.09 OTHER ABNORMAL GLUCOSE: ICD-10-CM

## 2024-05-21 PROCEDURE — G2211 COMPLEX E/M VISIT ADD ON: CPT

## 2024-05-21 PROCEDURE — 99214 OFFICE O/P EST MOD 30 MIN: CPT

## 2024-05-21 RX ORDER — TADALAFIL 5 MG/1
5 TABLET ORAL
Qty: 90 | Refills: 3 | Status: ACTIVE | COMMUNITY
Start: 2024-03-05 | End: 1900-01-01

## 2024-05-21 RX ORDER — TRAZODONE HYDROCHLORIDE 100 MG/1
100 TABLET ORAL
Qty: 90 | Refills: 0 | Status: DISCONTINUED | COMMUNITY
Start: 2024-03-04 | End: 2024-05-21

## 2024-05-21 NOTE — HEALTH RISK ASSESSMENT
[Yes] : Yes [Monthly or less (1 pt)] : Monthly or less (1 point) [1 or 2 (0 pts)] : 1 or 2 (0 points) [Never (0 pts)] : Never (0 points) [No falls in past year] : Patient reported no falls in the past year [0] : 2) Feeling down, depressed, or hopeless: Not at all (0) [PHQ-2 Negative - No further assessment needed] : PHQ-2 Negative - No further assessment needed [With Patient/Caregiver] : , with patient/caregiver [Never] : Never [0-4] : 0-4 [Audit-CScore] : 1 [de-identified] : average  [de-identified] : average  [facility] : 7 [[id number]] : 0 [AdvancecareDate] : 03/24

## 2024-05-21 NOTE — HISTORY OF PRESENT ILLNESS
[FreeTextEntry1] : 72 yo male with PMHx of HLD, BPH, and CHF presents s/p shingles of right arm. Patient also presenting for med management. Patient's diabetic diet controlled. Needs Optho

## 2024-05-21 NOTE — DATA REVIEWED
[FreeTextEntry1] : 74 yo male with PMHx of HLD, BPH, and CHF presents s/p shingles of right arm. Patient also presenting for med management. Patient's diabetic diet controlled.

## 2024-05-21 NOTE — PHYSICAL EXAM
[No Acute Distress] : no acute distress [Well Nourished] : well nourished [Well Developed] : well developed [Well-Appearing] : well-appearing [Normal Sclera/Conjunctiva] : normal sclera/conjunctiva [PERRL] : pupils equal round and reactive to light [EOMI] : extraocular movements intact [Normal Outer Ear/Nose] : the outer ears and nose were normal in appearance [Normal Oropharynx] : the oropharynx was normal [No JVD] : no jugular venous distention [No Lymphadenopathy] : no lymphadenopathy [Supple] : supple [Thyroid Normal, No Nodules] : the thyroid was normal and there were no nodules present [No Respiratory Distress] : no respiratory distress  [No Accessory Muscle Use] : no accessory muscle use [Clear to Auscultation] : lungs were clear to auscultation bilaterally [Normal Rate] : normal rate  [Regular Rhythm] : with a regular rhythm [Normal S1, S2] : normal S1 and S2 [No Murmur] : no murmur heard [No Carotid Bruits] : no carotid bruits [No Abdominal Bruit] : a ~M bruit was not heard ~T in the abdomen [No Varicosities] : no varicosities [Pedal Pulses Present] : the pedal pulses are present [No Edema] : there was no peripheral edema [No Palpable Aorta] : no palpable aorta [No Extremity Clubbing/Cyanosis] : no extremity clubbing/cyanosis [Soft] : abdomen soft [Non-distended] : non-distended [No Masses] : no abdominal mass palpated [No HSM] : no HSM [Normal Bowel Sounds] : normal bowel sounds [Normal Posterior Cervical Nodes] : no posterior cervical lymphadenopathy [Normal Anterior Cervical Nodes] : no anterior cervical lymphadenopathy [No CVA Tenderness] : no CVA  tenderness [No Spinal Tenderness] : no spinal tenderness [No Joint Swelling] : no joint swelling [Grossly Normal Strength/Tone] : grossly normal strength/tone [No Rash] : no rash [Coordination Grossly Intact] : coordination grossly intact [No Focal Deficits] : no focal deficits [Normal Gait] : normal gait [Deep Tendon Reflexes (DTR)] : deep tendon reflexes were 2+ and symmetric [Normal Affect] : the affect was normal [Normal Insight/Judgement] : insight and judgment were intact [de-identified] : tenderness in the LLQ

## 2024-05-21 NOTE — ASSESSMENT
[FreeTextEntry1] : patient 72 yo male s/p shingles of right arm, diabetic diet controlled, having grief reaction on death of life partner from cancer, needs labs and meds is very physically active PMH of HLD, Facial nerve paralysis, Ischemic stroke months ago. anxiety, BPH, Acoustic neuroma  meds and labs

## 2024-05-22 LAB
ALBUMIN SERPL ELPH-MCNC: 4.6 G/DL
ALP BLD-CCNC: 75 U/L
ALT SERPL-CCNC: 21 U/L
ANION GAP SERPL CALC-SCNC: 9 MMOL/L
AST SERPL-CCNC: 22 U/L
BILIRUB SERPL-MCNC: 0.7 MG/DL
BUN SERPL-MCNC: 25 MG/DL
CALCIUM SERPL-MCNC: 9.9 MG/DL
CHLORIDE SERPL-SCNC: 106 MMOL/L
CO2 SERPL-SCNC: 26 MMOL/L
CREAT SERPL-MCNC: 1.03 MG/DL
EGFR: 76 ML/MIN/1.73M2
GLUCOSE SERPL-MCNC: 108 MG/DL
MAGNESIUM SERPL-MCNC: 2.1 MG/DL
POTASSIUM SERPL-SCNC: 5.2 MMOL/L
PROT SERPL-MCNC: 6.9 G/DL
SODIUM SERPL-SCNC: 141 MMOL/L

## 2024-05-23 LAB
BASOPHILS # BLD AUTO: 0.05 K/UL
BASOPHILS NFR BLD AUTO: 0.5 %
EOSINOPHIL # BLD AUTO: 0.14 K/UL
EOSINOPHIL NFR BLD AUTO: 1.5 %
HCT VFR BLD CALC: 49.5 %
HGB BLD-MCNC: 15.8 G/DL
IMM GRANULOCYTES NFR BLD AUTO: 0.2 %
LYMPHOCYTES # BLD AUTO: 3.75 K/UL
LYMPHOCYTES NFR BLD AUTO: 39.3 %
MAN DIFF?: NORMAL
MCHC RBC-ENTMCNC: 29.9 PG
MCHC RBC-ENTMCNC: 31.9 GM/DL
MCV RBC AUTO: 93.8 FL
MONOCYTES # BLD AUTO: 0.6 K/UL
MONOCYTES NFR BLD AUTO: 6.3 %
NEUTROPHILS # BLD AUTO: 4.97 K/UL
NEUTROPHILS NFR BLD AUTO: 52.2 %
PLATELET # BLD AUTO: 300 K/UL
RBC # BLD: 5.28 M/UL
RBC # FLD: 14.1 %
WBC # FLD AUTO: 9.53 K/UL

## 2024-07-07 NOTE — COUNSELING
[Behavioral health counseling provided] : Behavioral health counseling provided [Engage in a relaxing activity] : Engage in a relaxing activity [Potential consequences of obesity discussed] : Potential consequences of obesity discussed N/A [None] : None [Good understanding] : Patient has a good understanding of lifestyle changes and steps needed to achieve self management goal

## 2024-07-16 ENCOUNTER — APPOINTMENT (OUTPATIENT)
Dept: FAMILY MEDICINE | Facility: CLINIC | Age: 74
End: 2024-07-16
Payer: MEDICARE

## 2024-07-18 ENCOUNTER — APPOINTMENT (OUTPATIENT)
Dept: OPHTHALMOLOGY | Facility: CLINIC | Age: 74
End: 2024-07-18
Payer: MEDICARE

## 2024-07-18 ENCOUNTER — NON-APPOINTMENT (OUTPATIENT)
Age: 74
End: 2024-07-18

## 2024-07-18 PROCEDURE — 92134 CPTRZ OPH DX IMG PST SGM RTA: CPT

## 2024-07-18 PROCEDURE — 92004 COMPRE OPH EXAM NEW PT 1/>: CPT

## 2024-07-22 ENCOUNTER — APPOINTMENT (OUTPATIENT)
Dept: FAMILY MEDICINE | Facility: CLINIC | Age: 74
End: 2024-07-22
Payer: MEDICARE

## 2024-07-22 ENCOUNTER — LABORATORY RESULT (OUTPATIENT)
Age: 74
End: 2024-07-22

## 2024-07-22 VITALS
DIASTOLIC BLOOD PRESSURE: 78 MMHG | TEMPERATURE: 98 F | OXYGEN SATURATION: 98 % | WEIGHT: 180 LBS | HEIGHT: 66 IN | HEART RATE: 64 BPM | BODY MASS INDEX: 28.93 KG/M2 | RESPIRATION RATE: 15 BRPM | SYSTOLIC BLOOD PRESSURE: 122 MMHG

## 2024-07-22 DIAGNOSIS — N40.0 BENIGN PROSTATIC HYPERPLASIA WITHOUT LOWER URINARY TRACT SYMPMS: ICD-10-CM

## 2024-07-22 DIAGNOSIS — I50.22 CHRONIC SYSTOLIC (CONGESTIVE) HEART FAILURE: ICD-10-CM

## 2024-07-22 DIAGNOSIS — G51.0 BELL'S PALSY: ICD-10-CM

## 2024-07-22 DIAGNOSIS — G47.00 INSOMNIA, UNSPECIFIED: ICD-10-CM

## 2024-07-22 DIAGNOSIS — D33.3 BENIGN NEOPLASM OF CRANIAL NERVES: ICD-10-CM

## 2024-07-22 DIAGNOSIS — R21 RASH AND OTHER NONSPECIFIC SKIN ERUPTION: ICD-10-CM

## 2024-07-22 DIAGNOSIS — M25.541 PAIN IN JOINTS OF RIGHT HAND: ICD-10-CM

## 2024-07-22 DIAGNOSIS — F41.8 OTHER SPECIFIED ANXIETY DISORDERS: ICD-10-CM

## 2024-07-22 DIAGNOSIS — R53.83 OTHER FATIGUE: ICD-10-CM

## 2024-07-22 DIAGNOSIS — B02.29 OTHER POSTHERPETIC NERVOUS SYSTEM INVOLVEMENT: ICD-10-CM

## 2024-07-22 DIAGNOSIS — M25.542 PAIN IN JOINTS OF RIGHT HAND: ICD-10-CM

## 2024-07-22 DIAGNOSIS — E78.5 HYPERLIPIDEMIA, UNSPECIFIED: ICD-10-CM

## 2024-07-22 DIAGNOSIS — Z86.73 PERSONAL HISTORY OF TRANSIENT ISCHEMIC ATTACK (TIA), AND CEREBRAL INFARCTION W/OUT RESIDUAL DEFICITS: ICD-10-CM

## 2024-07-22 DIAGNOSIS — N52.9 MALE ERECTILE DYSFUNCTION, UNSPECIFIED: ICD-10-CM

## 2024-07-22 PROCEDURE — G2211 COMPLEX E/M VISIT ADD ON: CPT

## 2024-07-22 PROCEDURE — 36415 COLL VENOUS BLD VENIPUNCTURE: CPT

## 2024-07-22 PROCEDURE — 99214 OFFICE O/P EST MOD 30 MIN: CPT

## 2024-07-22 RX ORDER — PREDNISONE 10 MG/1
10 TABLET ORAL
Qty: 30 | Refills: 0 | Status: ACTIVE | COMMUNITY
Start: 2024-07-22 | End: 1900-01-01

## 2024-07-22 NOTE — HEALTH RISK ASSESSMENT
[Yes] : Yes [Monthly or less (1 pt)] : Monthly or less (1 point) [1 or 2 (0 pts)] : 1 or 2 (0 points) [Never (0 pts)] : Never (0 points) [No falls in past year] : Patient reported no falls in the past year [0] : 2) Feeling down, depressed, or hopeless: Not at all (0) [PHQ-2 Negative - No further assessment needed] : PHQ-2 Negative - No further assessment needed [With Patient/Caregiver] : , with patient/caregiver [Never] : Never [0-4] : 0-4

## 2024-07-22 NOTE — COUNSELING
[Behavioral health counseling provided] : Behavioral health counseling provided [Engage in a relaxing activity] : Engage in a relaxing activity [Potential consequences of obesity discussed] : Potential consequences of obesity discussed [None] : None [Good understanding] : Patient has a good understanding of lifestyle changes and steps needed to achieve self management goal

## 2024-07-23 LAB
ALBUMIN SERPL ELPH-MCNC: 4.5 G/DL
ALP BLD-CCNC: 71 U/L
ALT SERPL-CCNC: 21 U/L
ANION GAP SERPL CALC-SCNC: 11 MMOL/L
AST SERPL-CCNC: 21 U/L
BILIRUB SERPL-MCNC: 0.3 MG/DL
BUN SERPL-MCNC: 28 MG/DL
CALCIUM SERPL-MCNC: 9.9 MG/DL
CHLORIDE SERPL-SCNC: 104 MMOL/L
CO2 SERPL-SCNC: 25 MMOL/L
CREAT SERPL-MCNC: 1.08 MG/DL
EGFR: 72 ML/MIN/1.73M2
ERYTHROCYTE [SEDIMENTATION RATE] IN BLOOD BY WESTERGREN METHOD: 7 MM/HR
GLUCOSE SERPL-MCNC: 91 MG/DL
POTASSIUM SERPL-SCNC: 4.4 MMOL/L
PROT SERPL-MCNC: 7.1 G/DL
RHEUMATOID FACT SER QL: <10 IU/ML
SODIUM SERPL-SCNC: 141 MMOL/L
URATE SERPL-MCNC: 4.5 MG/DL

## 2024-07-25 LAB
BASOPHILS # BLD AUTO: 0.05 K/UL
BASOPHILS NFR BLD AUTO: 0.5 %
EOSINOPHIL # BLD AUTO: 0.13 K/UL
EOSINOPHIL NFR BLD AUTO: 1.2 %
HCT VFR BLD CALC: 47.7 %
HGB BLD-MCNC: 15.1 G/DL
IMM GRANULOCYTES NFR BLD AUTO: 0.3 %
LYMPHOCYTES # BLD AUTO: 3.78 K/UL
LYMPHOCYTES NFR BLD AUTO: 36.1 %
MAN DIFF?: NORMAL
MCHC RBC-ENTMCNC: 30 PG
MCHC RBC-ENTMCNC: 31.7 GM/DL
MCV RBC AUTO: 94.6 FL
MONOCYTES # BLD AUTO: 0.7 K/UL
MONOCYTES NFR BLD AUTO: 6.7 %
NEUTROPHILS # BLD AUTO: 5.78 K/UL
NEUTROPHILS NFR BLD AUTO: 55.2 %
PLATELET # BLD AUTO: 321 K/UL
RBC # BLD: 5.04 M/UL
RBC # FLD: 14.2 %
WBC # FLD AUTO: 10.47 K/UL

## 2024-08-14 ENCOUNTER — APPOINTMENT (OUTPATIENT)
Dept: CARDIOLOGY | Facility: CLINIC | Age: 74
End: 2024-08-14

## 2024-09-05 ENCOUNTER — APPOINTMENT (OUTPATIENT)
Dept: OPHTHALMOLOGY | Facility: CLINIC | Age: 74
End: 2024-09-05

## 2025-01-08 ENCOUNTER — APPOINTMENT (OUTPATIENT)
Dept: FAMILY MEDICINE | Facility: CLINIC | Age: 75
End: 2025-01-08

## 2025-05-09 ENCOUNTER — APPOINTMENT (OUTPATIENT)
Dept: PLASTIC SURGERY | Facility: CLINIC | Age: 75
End: 2025-05-09

## 2025-07-23 ENCOUNTER — NON-APPOINTMENT (OUTPATIENT)
Age: 75
End: 2025-07-23

## 2025-07-24 ENCOUNTER — APPOINTMENT (OUTPATIENT)
Dept: FAMILY MEDICINE | Facility: CLINIC | Age: 75
End: 2025-07-24
Payer: MEDICARE

## 2025-07-24 VITALS
BODY MASS INDEX: 31.66 KG/M2 | HEART RATE: 77 BPM | RESPIRATION RATE: 15 BRPM | WEIGHT: 197 LBS | OXYGEN SATURATION: 98 % | SYSTOLIC BLOOD PRESSURE: 122 MMHG | HEIGHT: 66 IN | DIASTOLIC BLOOD PRESSURE: 70 MMHG

## 2025-07-24 DIAGNOSIS — M54.2 CERVICALGIA: ICD-10-CM

## 2025-07-24 PROCEDURE — 99213 OFFICE O/P EST LOW 20 MIN: CPT

## 2025-09-11 ENCOUNTER — APPOINTMENT (OUTPATIENT)
Dept: FAMILY MEDICINE | Facility: CLINIC | Age: 75
End: 2025-09-11
Payer: MEDICARE

## 2025-09-11 VITALS
HEART RATE: 81 BPM | WEIGHT: 193 LBS | HEIGHT: 66 IN | TEMPERATURE: 98.3 F | SYSTOLIC BLOOD PRESSURE: 132 MMHG | OXYGEN SATURATION: 98 % | DIASTOLIC BLOOD PRESSURE: 80 MMHG | BODY MASS INDEX: 31.02 KG/M2

## 2025-09-11 DIAGNOSIS — N52.9 MALE ERECTILE DYSFUNCTION, UNSPECIFIED: ICD-10-CM

## 2025-09-11 DIAGNOSIS — N40.0 BENIGN PROSTATIC HYPERPLASIA WITHOUT LOWER URINARY TRACT SYMPMS: ICD-10-CM

## 2025-09-11 DIAGNOSIS — D33.3 BENIGN NEOPLASM OF CRANIAL NERVES: ICD-10-CM

## 2025-09-11 DIAGNOSIS — I49.9 CARDIAC ARRHYTHMIA, UNSPECIFIED: ICD-10-CM

## 2025-09-11 DIAGNOSIS — Z01.818 ENCOUNTER FOR OTHER PREPROCEDURAL EXAMINATION: ICD-10-CM

## 2025-09-11 DIAGNOSIS — E78.5 HYPERLIPIDEMIA, UNSPECIFIED: ICD-10-CM

## 2025-09-11 DIAGNOSIS — R94.31 ABNORMAL ELECTROCARDIOGRAM [ECG] [EKG]: ICD-10-CM

## 2025-09-11 DIAGNOSIS — I50.22 CHRONIC SYSTOLIC (CONGESTIVE) HEART FAILURE: ICD-10-CM

## 2025-09-11 PROCEDURE — 99215 OFFICE O/P EST HI 40 MIN: CPT
